# Patient Record
Sex: FEMALE | Race: WHITE | NOT HISPANIC OR LATINO | URBAN - METROPOLITAN AREA
[De-identification: names, ages, dates, MRNs, and addresses within clinical notes are randomized per-mention and may not be internally consistent; named-entity substitution may affect disease eponyms.]

---

## 2017-12-19 ENCOUNTER — EMERGENCY (EMERGENCY)
Facility: HOSPITAL | Age: 77
LOS: 0 days | Discharge: HOME | End: 2017-12-19
Attending: EMERGENCY MEDICINE | Admitting: EMERGENCY MEDICINE
Payer: MEDICARE

## 2017-12-19 VITALS
HEART RATE: 72 BPM | SYSTOLIC BLOOD PRESSURE: 142 MMHG | TEMPERATURE: 98 F | RESPIRATION RATE: 18 BRPM | DIASTOLIC BLOOD PRESSURE: 50 MMHG | OXYGEN SATURATION: 94 %

## 2017-12-19 VITALS — WEIGHT: 255.07 LBS | HEIGHT: 67 IN

## 2017-12-19 DIAGNOSIS — R05 COUGH: ICD-10-CM

## 2017-12-19 DIAGNOSIS — E03.9 HYPOTHYROIDISM, UNSPECIFIED: ICD-10-CM

## 2017-12-19 DIAGNOSIS — I49.9 CARDIAC ARRHYTHMIA, UNSPECIFIED: ICD-10-CM

## 2017-12-19 DIAGNOSIS — Z79.899 OTHER LONG TERM (CURRENT) DRUG THERAPY: ICD-10-CM

## 2017-12-19 DIAGNOSIS — J20.9 ACUTE BRONCHITIS, UNSPECIFIED: ICD-10-CM

## 2017-12-19 LAB
ALBUMIN SERPL ELPH-MCNC: 3.6 G/DL — SIGNIFICANT CHANGE UP (ref 3.3–5)
ALP SERPL-CCNC: 91 U/L — SIGNIFICANT CHANGE UP (ref 40–120)
ALT FLD-CCNC: 46 U/L — SIGNIFICANT CHANGE UP (ref 12–78)
ANION GAP SERPL CALC-SCNC: 11 MMOL/L — SIGNIFICANT CHANGE UP (ref 5–17)
APTT BLD: 25.9 SEC — LOW (ref 27.5–37.4)
AST SERPL-CCNC: 77 U/L — HIGH (ref 15–37)
BASOPHILS # BLD AUTO: 0.1 K/UL — SIGNIFICANT CHANGE UP (ref 0–0.2)
BASOPHILS NFR BLD AUTO: 1.2 % — SIGNIFICANT CHANGE UP (ref 0–2)
BILIRUB SERPL-MCNC: 0.8 MG/DL — SIGNIFICANT CHANGE UP (ref 0.2–1.2)
BUN SERPL-MCNC: 21 MG/DL — SIGNIFICANT CHANGE UP (ref 7–23)
CALCIUM SERPL-MCNC: 8.7 MG/DL — SIGNIFICANT CHANGE UP (ref 8.5–10.1)
CHLORIDE SERPL-SCNC: 101 MMOL/L — SIGNIFICANT CHANGE UP (ref 96–108)
CO2 SERPL-SCNC: 21 MMOL/L — LOW (ref 22–31)
CREAT SERPL-MCNC: 1.3 MG/DL — SIGNIFICANT CHANGE UP (ref 0.5–1.3)
EOSINOPHIL # BLD AUTO: 0.1 K/UL — SIGNIFICANT CHANGE UP (ref 0–0.5)
EOSINOPHIL NFR BLD AUTO: 3 % — SIGNIFICANT CHANGE UP (ref 0–6)
GLUCOSE SERPL-MCNC: 96 MG/DL — SIGNIFICANT CHANGE UP (ref 70–99)
HCT VFR BLD CALC: 45.4 % — HIGH (ref 34.5–45)
HGB BLD-MCNC: 15.6 G/DL — HIGH (ref 11.5–15.5)
INR BLD: 1.16 RATIO — SIGNIFICANT CHANGE UP (ref 0.88–1.16)
LYMPHOCYTES # BLD AUTO: 2.2 K/UL — SIGNIFICANT CHANGE UP (ref 1–3.3)
LYMPHOCYTES # BLD AUTO: 46.4 % — HIGH (ref 13–44)
MCHC RBC-ENTMCNC: 32.1 PG — SIGNIFICANT CHANGE UP (ref 27–34)
MCHC RBC-ENTMCNC: 34.4 GM/DL — SIGNIFICANT CHANGE UP (ref 32–36)
MCV RBC AUTO: 93.4 FL — SIGNIFICANT CHANGE UP (ref 80–100)
MONOCYTES # BLD AUTO: 0.9 K/UL — SIGNIFICANT CHANGE UP (ref 0–0.9)
MONOCYTES NFR BLD AUTO: 18.6 % — HIGH (ref 2–14)
NEUTROPHILS # BLD AUTO: 1.5 K/UL — LOW (ref 1.8–7.4)
NEUTROPHILS NFR BLD AUTO: 30.8 % — LOW (ref 43–77)
PLATELET # BLD AUTO: 138 K/UL — LOW (ref 150–400)
POTASSIUM SERPL-MCNC: 4.7 MMOL/L — SIGNIFICANT CHANGE UP (ref 3.5–5.3)
POTASSIUM SERPL-SCNC: 4.7 MMOL/L — SIGNIFICANT CHANGE UP (ref 3.5–5.3)
PROT SERPL-MCNC: 7.7 GM/DL — SIGNIFICANT CHANGE UP (ref 6–8.3)
PROTHROM AB SERPL-ACNC: 12.6 SEC — SIGNIFICANT CHANGE UP (ref 9.8–12.7)
RBC # BLD: 4.86 M/UL — SIGNIFICANT CHANGE UP (ref 3.8–5.2)
RBC # FLD: 12.2 % — SIGNIFICANT CHANGE UP (ref 10.3–14.5)
SODIUM SERPL-SCNC: 133 MMOL/L — LOW (ref 135–145)
WBC # BLD: 4.8 K/UL — SIGNIFICANT CHANGE UP (ref 3.8–10.5)
WBC # FLD AUTO: 4.8 K/UL — SIGNIFICANT CHANGE UP (ref 3.8–10.5)

## 2017-12-19 PROCEDURE — 93010 ELECTROCARDIOGRAM REPORT: CPT

## 2017-12-19 PROCEDURE — 99285 EMERGENCY DEPT VISIT HI MDM: CPT

## 2017-12-19 PROCEDURE — 71250 CT THORAX DX C-: CPT | Mod: 26

## 2017-12-19 RX ORDER — ALBUTEROL 90 UG/1
2 AEROSOL, METERED ORAL
Qty: 1 | Refills: 0
Start: 2017-12-19 | End: 2018-01-17

## 2017-12-19 RX ORDER — SODIUM CHLORIDE 9 MG/ML
1000 INJECTION INTRAMUSCULAR; INTRAVENOUS; SUBCUTANEOUS ONCE
Qty: 0 | Refills: 0 | Status: COMPLETED | OUTPATIENT
Start: 2017-12-19 | End: 2017-12-19

## 2017-12-19 RX ORDER — IPRATROPIUM/ALBUTEROL SULFATE 18-103MCG
3 AEROSOL WITH ADAPTER (GRAM) INHALATION ONCE
Qty: 0 | Refills: 0 | Status: COMPLETED | OUTPATIENT
Start: 2017-12-19 | End: 2017-12-19

## 2017-12-19 RX ORDER — ONDANSETRON 8 MG/1
4 TABLET, FILM COATED ORAL ONCE
Qty: 0 | Refills: 0 | Status: COMPLETED | OUTPATIENT
Start: 2017-12-19 | End: 2017-12-19

## 2017-12-19 RX ADMIN — ONDANSETRON 4 MILLIGRAM(S): 8 TABLET, FILM COATED ORAL at 18:27

## 2017-12-19 RX ADMIN — Medication 125 MILLIGRAM(S): at 18:27

## 2017-12-19 RX ADMIN — SODIUM CHLORIDE 1000 MILLILITER(S): 9 INJECTION INTRAMUSCULAR; INTRAVENOUS; SUBCUTANEOUS at 18:26

## 2017-12-19 RX ADMIN — Medication 3 MILLILITER(S): at 18:28

## 2017-12-19 NOTE — ED STATDOCS - ATTENDING CONTRIBUTION TO CARE
I, Ankit Lemus, performed the initial face to face bedside interview with this patient regarding history of present illness, review of symptoms and relevant past medical, social and family history.  I completed an independent physical examination.  I was the initial provider who evaluated this patient.  The history, relevant review of systems, past medical and surgical history, medical decision making, and physical examination was documented by the scribe in my presence and I attest to the accuracy of the documentation.  I have signed out the follow up of any pending tests (i.e. labs, radiological studies) to the ACP.  I have communicated the patient’s plan of care and disposition with the ACP.

## 2017-12-19 NOTE — ED STATDOCS - OBJECTIVE STATEMENT
78 yo healthy female sent in by Dr. Roper today for evaluation of cough and fever x 5 days.  States temp was 101F yesterday.  Pt has been weak, vomiting and unable to tolerate any PO intake. 78 yo female, with solitary kidney, sent in by Dr. Roper today for evaluation of cough and fever x 5 days.  States temp was 101F yesterday.  Pt has been weak, vomiting and unable to tolerate any PO intake. 78 yo female, PMH hypothyroidism, cardiac arrhythmia, solitary kidney, sent in by Dr. Roper today for evaluation of cough and fever x 5 days.  States temp was 101F yesterday.  Pt has been weak, vomiting and unable to tolerate any PO intake.

## 2017-12-19 NOTE — ED STATDOCS - PROGRESS NOTE DETAILS
PHILLIP Yousif:   Patient has been seen, evaluated and orders have been written by the attending in intake. Patient is stable.  I will follow up the results of orders written and I will continue to evaluate/observe the patient.  Patient with cough, finsihed three day course of Augmentin, then Biaxin x 2 days for bronchitis.  Pt. with decreased appetite and little PO intake.  Weakness reported.  Sent in for evaluation by Dr. Roper.  Fever 101 at home.  Hx hypothyroidism, arrythmia on beta blocker.  PE with wheezing RLL field.  Will obtain CT/labs/hydration/nebs.  Corinna Yousif PA-C Pt. much improved.  Pt. drinking and tolerating solids.  CT Chest negative for pneumonia.  Will treat with steroids, inhaler and PMD follow up.  Corinna Yousif PA-C Sat ox 94% RA

## 2017-12-19 NOTE — ED ADULT NURSE NOTE - OBJECTIVE STATEMENT
pt presents to ED with sore throat and fever x few days. pt states she was prescribed ABX with no relief. pt c/o generalized weakness and dizziness. denies v/d, a&Ox3. breathing si even and unlabored. will continue to monitor and reassess

## 2017-12-19 NOTE — ED ADULT TRIAGE NOTE - CHIEF COMPLAINT QUOTE
Pt sent by Dr. Roper for dizziness.  Recently treated for URI with clarithromycin, prior fever but afebrile at home today.

## 2018-08-31 ENCOUNTER — APPOINTMENT (OUTPATIENT)
Dept: DERMATOLOGY | Facility: CLINIC | Age: 78
End: 2018-08-31
Payer: MEDICARE

## 2018-08-31 VITALS — WEIGHT: 250 LBS | HEIGHT: 67 IN | BODY MASS INDEX: 39.24 KG/M2

## 2018-08-31 DIAGNOSIS — Z87.898 PERSONAL HISTORY OF OTHER SPECIFIED CONDITIONS: ICD-10-CM

## 2018-08-31 DIAGNOSIS — Z86.39 PERSONAL HISTORY OF OTHER ENDOCRINE, NUTRITIONAL AND METABOLIC DISEASE: ICD-10-CM

## 2018-08-31 DIAGNOSIS — D18.00 HEMANGIOMA UNSPECIFIED SITE: ICD-10-CM

## 2018-08-31 DIAGNOSIS — L82.1 OTHER SEBORRHEIC KERATOSIS: ICD-10-CM

## 2018-08-31 DIAGNOSIS — Z80.9 FAMILY HISTORY OF MALIGNANT NEOPLASM, UNSPECIFIED: ICD-10-CM

## 2018-08-31 PROCEDURE — 99203 OFFICE O/P NEW LOW 30 MIN: CPT

## 2018-08-31 RX ORDER — PROPRANOLOL HYDROCHLORIDE 80 MG/1
TABLET ORAL
Refills: 0 | Status: ACTIVE | COMMUNITY

## 2018-08-31 RX ORDER — LEVOTHYROXINE SODIUM 0.12 MG/1
125 TABLET ORAL
Refills: 0 | Status: ACTIVE | COMMUNITY

## 2018-09-01 PROBLEM — E03.9 HYPOTHYROIDISM, UNSPECIFIED: Chronic | Status: ACTIVE | Noted: 2017-12-20

## 2018-09-01 PROBLEM — I49.9 CARDIAC ARRHYTHMIA, UNSPECIFIED: Chronic | Status: ACTIVE | Noted: 2017-12-20

## 2018-09-24 ENCOUNTER — APPOINTMENT (OUTPATIENT)
Dept: DERMATOLOGY | Facility: CLINIC | Age: 78
End: 2018-09-24
Payer: MEDICARE

## 2018-09-24 VITALS — BODY MASS INDEX: 39.24 KG/M2 | HEIGHT: 67 IN | WEIGHT: 250 LBS

## 2018-09-24 DIAGNOSIS — Z41.1 ENCOUNTER FOR COSMETIC SURGERY: ICD-10-CM

## 2018-09-24 PROCEDURE — D0051: CPT

## 2018-12-10 ENCOUNTER — APPOINTMENT (OUTPATIENT)
Dept: DERMATOLOGY | Facility: CLINIC | Age: 78
End: 2018-12-10

## 2019-05-19 ENCOUNTER — INPATIENT (INPATIENT)
Facility: HOSPITAL | Age: 79
LOS: 1 days | Discharge: HOME | End: 2019-05-21
Attending: FAMILY MEDICINE | Admitting: FAMILY MEDICINE
Payer: MEDICARE

## 2019-05-19 VITALS
OXYGEN SATURATION: 98 % | SYSTOLIC BLOOD PRESSURE: 180 MMHG | TEMPERATURE: 98 F | RESPIRATION RATE: 17 BRPM | HEART RATE: 51 BPM | DIASTOLIC BLOOD PRESSURE: 70 MMHG

## 2019-05-19 DIAGNOSIS — Z90.5 ACQUIRED ABSENCE OF KIDNEY: Chronic | ICD-10-CM

## 2019-05-19 LAB
ADD ON TEST-SPECIMEN IN LAB: SIGNIFICANT CHANGE UP
ALBUMIN SERPL ELPH-MCNC: 3.7 G/DL — SIGNIFICANT CHANGE UP (ref 3.3–5)
ALP SERPL-CCNC: 113 U/L — SIGNIFICANT CHANGE UP (ref 40–120)
ALT FLD-CCNC: 241 U/L — HIGH (ref 12–78)
ANION GAP SERPL CALC-SCNC: 8 MMOL/L — SIGNIFICANT CHANGE UP (ref 5–17)
APPEARANCE UR: CLEAR — SIGNIFICANT CHANGE UP
AST SERPL-CCNC: 175 U/L — HIGH (ref 15–37)
BACTERIA # UR AUTO: ABNORMAL
BASOPHILS # BLD AUTO: 0.02 K/UL — SIGNIFICANT CHANGE UP (ref 0–0.2)
BASOPHILS NFR BLD AUTO: 0.3 % — SIGNIFICANT CHANGE UP (ref 0–2)
BILIRUB SERPL-MCNC: 0.7 MG/DL — SIGNIFICANT CHANGE UP (ref 0.2–1.2)
BILIRUB UR-MCNC: NEGATIVE — SIGNIFICANT CHANGE UP
BUN SERPL-MCNC: 22 MG/DL — SIGNIFICANT CHANGE UP (ref 7–23)
CALCIUM SERPL-MCNC: 9.4 MG/DL — SIGNIFICANT CHANGE UP (ref 8.5–10.1)
CHLORIDE SERPL-SCNC: 108 MMOL/L — SIGNIFICANT CHANGE UP (ref 96–108)
CO2 SERPL-SCNC: 27 MMOL/L — SIGNIFICANT CHANGE UP (ref 22–31)
COLOR SPEC: YELLOW — SIGNIFICANT CHANGE UP
CREAT SERPL-MCNC: 1.15 MG/DL — SIGNIFICANT CHANGE UP (ref 0.5–1.3)
D DIMER BLD IA.RAPID-MCNC: 397 NG/ML DDU — HIGH
DIFF PNL FLD: ABNORMAL
EOSINOPHIL # BLD AUTO: 0.01 K/UL — SIGNIFICANT CHANGE UP (ref 0–0.5)
EOSINOPHIL NFR BLD AUTO: 0.1 % — SIGNIFICANT CHANGE UP (ref 0–6)
EPI CELLS # UR: NEGATIVE — SIGNIFICANT CHANGE UP
GLUCOSE SERPL-MCNC: 111 MG/DL — HIGH (ref 70–99)
GLUCOSE UR QL: NEGATIVE MG/DL — SIGNIFICANT CHANGE UP
HCT VFR BLD CALC: 40.1 % — SIGNIFICANT CHANGE UP (ref 34.5–45)
HGB BLD-MCNC: 13.1 G/DL — SIGNIFICANT CHANGE UP (ref 11.5–15.5)
IMM GRANULOCYTES NFR BLD AUTO: 1.9 % — HIGH (ref 0–1.5)
INR BLD: 1.04 RATIO — SIGNIFICANT CHANGE UP (ref 0.88–1.16)
KETONES UR-MCNC: NEGATIVE — SIGNIFICANT CHANGE UP
LEUKOCYTE ESTERASE UR-ACNC: NEGATIVE — SIGNIFICANT CHANGE UP
LYMPHOCYTES # BLD AUTO: 1.64 K/UL — SIGNIFICANT CHANGE UP (ref 1–3.3)
LYMPHOCYTES # BLD AUTO: 21.2 % — SIGNIFICANT CHANGE UP (ref 13–44)
MAGNESIUM SERPL-MCNC: 2.3 MG/DL — SIGNIFICANT CHANGE UP (ref 1.6–2.6)
MCHC RBC-ENTMCNC: 31.7 PG — SIGNIFICANT CHANGE UP (ref 27–34)
MCHC RBC-ENTMCNC: 32.7 GM/DL — SIGNIFICANT CHANGE UP (ref 32–36)
MCV RBC AUTO: 97.1 FL — SIGNIFICANT CHANGE UP (ref 80–100)
MONOCYTES # BLD AUTO: 0.49 K/UL — SIGNIFICANT CHANGE UP (ref 0–0.9)
MONOCYTES NFR BLD AUTO: 6.3 % — SIGNIFICANT CHANGE UP (ref 2–14)
NEUTROPHILS # BLD AUTO: 5.41 K/UL — SIGNIFICANT CHANGE UP (ref 1.8–7.4)
NEUTROPHILS NFR BLD AUTO: 70.2 % — SIGNIFICANT CHANGE UP (ref 43–77)
NITRITE UR-MCNC: NEGATIVE — SIGNIFICANT CHANGE UP
NT-PROBNP SERPL-SCNC: 742 PG/ML — HIGH (ref 0–450)
NT-PROBNP SERPL-SCNC: 753 PG/ML — HIGH (ref 0–450)
PH UR: 7 — SIGNIFICANT CHANGE UP (ref 5–8)
PLATELET # BLD AUTO: 160 K/UL — SIGNIFICANT CHANGE UP (ref 150–400)
POTASSIUM SERPL-MCNC: 4 MMOL/L — SIGNIFICANT CHANGE UP (ref 3.5–5.3)
POTASSIUM SERPL-SCNC: 4 MMOL/L — SIGNIFICANT CHANGE UP (ref 3.5–5.3)
PROT SERPL-MCNC: 7.1 GM/DL — SIGNIFICANT CHANGE UP (ref 6–8.3)
PROT UR-MCNC: 100 MG/DL
PROTHROM AB SERPL-ACNC: 11.6 SEC — SIGNIFICANT CHANGE UP (ref 10–12.9)
RBC # BLD: 4.13 M/UL — SIGNIFICANT CHANGE UP (ref 3.8–5.2)
RBC # FLD: 14.3 % — SIGNIFICANT CHANGE UP (ref 10.3–14.5)
RBC CASTS # UR COMP ASSIST: SIGNIFICANT CHANGE UP /HPF (ref 0–4)
SODIUM SERPL-SCNC: 143 MMOL/L — SIGNIFICANT CHANGE UP (ref 135–145)
SP GR SPEC: 1 — LOW (ref 1.01–1.02)
TROPONIN I SERPL-MCNC: <0.015 NG/ML — SIGNIFICANT CHANGE UP (ref 0.01–0.04)
TROPONIN I SERPL-MCNC: <0.015 NG/ML — SIGNIFICANT CHANGE UP (ref 0.01–0.04)
TSH SERPL-MCNC: 2.97 UU/ML — SIGNIFICANT CHANGE UP (ref 0.34–4.82)
UROBILINOGEN FLD QL: NEGATIVE MG/DL — SIGNIFICANT CHANGE UP
WBC # BLD: 7.72 K/UL — SIGNIFICANT CHANGE UP (ref 3.8–10.5)
WBC # FLD AUTO: 7.72 K/UL — SIGNIFICANT CHANGE UP (ref 3.8–10.5)
WBC UR QL: SIGNIFICANT CHANGE UP

## 2019-05-19 PROCEDURE — 99285 EMERGENCY DEPT VISIT HI MDM: CPT

## 2019-05-19 PROCEDURE — 71045 X-RAY EXAM CHEST 1 VIEW: CPT | Mod: 26

## 2019-05-19 PROCEDURE — 93010 ELECTROCARDIOGRAM REPORT: CPT

## 2019-05-19 RX ORDER — LEVOTHYROXINE SODIUM 125 MCG
125 TABLET ORAL DAILY
Refills: 0 | Status: DISCONTINUED | OUTPATIENT
Start: 2019-05-19 | End: 2019-05-21

## 2019-05-19 RX ORDER — ASPIRIN/CALCIUM CARB/MAGNESIUM 324 MG
325 TABLET ORAL ONCE
Refills: 0 | Status: COMPLETED | OUTPATIENT
Start: 2019-05-19 | End: 2019-05-19

## 2019-05-19 RX ORDER — FUROSEMIDE 40 MG
40 TABLET ORAL ONCE
Refills: 0 | Status: DISCONTINUED | OUTPATIENT
Start: 2019-05-19 | End: 2019-05-19

## 2019-05-19 RX ORDER — HYDRALAZINE HCL 50 MG
10 TABLET ORAL EVERY 6 HOURS
Refills: 0 | Status: DISCONTINUED | OUTPATIENT
Start: 2019-05-19 | End: 2019-05-20

## 2019-05-19 RX ORDER — HYDRALAZINE HCL 50 MG
5 TABLET ORAL ONCE
Refills: 0 | Status: COMPLETED | OUTPATIENT
Start: 2019-05-19 | End: 2019-05-19

## 2019-05-19 RX ORDER — ASPIRIN/CALCIUM CARB/MAGNESIUM 324 MG
81 TABLET ORAL DAILY
Refills: 0 | Status: DISCONTINUED | OUTPATIENT
Start: 2019-05-19 | End: 2019-05-20

## 2019-05-19 RX ADMIN — Medication 5 MILLIGRAM(S): at 21:32

## 2019-05-19 RX ADMIN — Medication 325 MILLIGRAM(S): at 20:04

## 2019-05-19 NOTE — ED STATDOCS - PROGRESS NOTE DETAILS
signed Ann Min PA-C Pt seen initially in intake by Dr Owens.   78F c/o CP/SOB/LE edema x 2 weeks. Pt went to urgent care and was put on steroids for a chronic cough, but that hasn't helped. 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 Pt has no card or PMD, usually goes to [facility] to see Dr Mcmanus. Pt alert, NAD, 2+ pitting edema bilateral LEs. No conversational dyspnea. Pt isreal on EKG and on cardiac monitor she goes into low 40s. D Dimer negative when adjusted for age. Elevated LFTs of unclear etiology, slightly elevated BNP. cxr with cardiomegaly. Will admit for CP and bradycardia. Pt agrees with admission and plan of care. Case discussed with and admission accepted by hospitalist Dr. Wendy Suárez.

## 2019-05-19 NOTE — H&P ADULT - ASSESSMENT
78y F PMHx hypothyroidism, anxiety, presents to ED for chest pain. 78y F PMHx hypothyroidism, anxiety, presents to ED for chest pain.    # Chest pain likely secondary to hypertensive urgency  - admit to telemetry  - EKG noted  - Troponins : wnl  -  given in ED  - continue ASA 81   - Hydralazine 5 mg IV push and lasix given in ED  - continue  Hydralazine for BP control- no BB because of bradycardia  - 2 D Echocardiogram  - Cardiology consult    # sinus Bradycardia likely medication induced ( pt on Propranolol ) vs ischemia  - hold Propranolol for now and monitor HR  - hold off on BB   - consider EP consult - if no improvement after holding Propranolol  - hold off     # Transminitis  - Acute hepatitis panel  - US abdomen      # elevated proBNP  - control hypertension  - restrict Na intake  - continue to trend  - got lasix 40 mg in ED    # Elevated D- dimer  - CTA cannot be done because of history of dye allergy  - consider V/ Q scan  - Troponins wnl      # unilateral kidney  - avoid nephrotoxic medications  - will hold off on further lasix    # hypothyroidism  - continue Levothyroxine 125      # VTE  IMPROVE VTE Individual Risk Assessment    RISK                                                                Points  [  ] Previous VTE                                                  3  [  ] Thrombophilia                                               2  [  ] Lower limb paralysis                                      2        (unable to hold up >15 seconds)    [  ] Current Cancer                                              2         (within 6 months)  [  ] Immobilization > 24 hrs                                1  [  ] ICU/CCU stay > 24 hours                              1  [x  ] Age > 60                                                      1    IMPROVE VTE Score _____1____    IMPROVE Score 0-1: Low Risk, No VTE prophylaxis required for most patients, encourage ambulation.   IMPROVE Score 2-3: At risk, pharmacologic VTE prophylaxis is indicated for most patients (in the absence of a contraindication)  IMPROVE Score > or = 4: High Risk, pharmacologic VTE prophylaxis is indicated for most patients (in the absence of a contraindication) 78y F PMHx hypothyroidism, anxiety, presents to ED for chest pain.    # Chest pain likely secondary to hypertensive urgency  - admit to telemetry  - EKG noted  - Troponins : wnl  -  given in ED  - continue ASA 81   - Hydralazine 5 mg IV push and lasix given in ED  - continue  Hydralazine for BP control- no BB because of bradycardia  - 2 D Echocardiogram  - Cardiology consult    # sinus Bradycardia likely medication induced ( pt on Propranolol ) vs ischemia  - hold Propranolol for now and monitor HR  - hold off on BB   - consider EP consult - if no improvement after holding Propranolol  - hold off     # Transminitis  - Acute hepatitis panel  - US abdomen      # elevated proBNP  - control hypertension  - restrict Na intake  - continue to trend  - got lasix 40 mg in ED    # Elevated D- dimer 376  but is not positive for age appropriate  - CTA cannot be done because of history of dye allergy  - Wells criteria score of 0 -    - Troponins wnl    # lower extremity edema   - Lower extremity doppler      # unilateral kidney  - avoid nephrotoxic medications  - will hold off on further lasix    # hypothyroidism  - continue Levothyroxine 125      # VTE  IMPROVE VTE Individual Risk Assessment    RISK                                                                Points  [  ] Previous VTE                                                  3  [  ] Thrombophilia                                               2  [  ] Lower limb paralysis                                      2        (unable to hold up >15 seconds)    [  ] Current Cancer                                              2         (within 6 months)  [  ] Immobilization > 24 hrs                                1  [  ] ICU/CCU stay > 24 hours                              1  [x  ] Age > 60                                                      1    IMPROVE VTE Score _____1____    IMPROVE Score 0-1: Low Risk, No VTE prophylaxis required for most patients, encourage ambulation.   IMPROVE Score 2-3: At risk, pharmacologic VTE prophylaxis is indicated for most patients (in the absence of a contraindication)  IMPROVE Score > or = 4: High Risk, pharmacologic VTE prophylaxis is indicated for most patients (in the absence of a contraindication)

## 2019-05-19 NOTE — ED ADULT TRIAGE NOTE - CHIEF COMPLAINT QUOTE
Patient presents reporting chest pain shortness of breath since last week describes pain as burning. Reports increased blood pressure today and increased fluid retention

## 2019-05-19 NOTE — H&P ADULT - RS GEN PE MLT RESP DETAILS PC
no intercostal retractions/clear to auscultation bilaterally/airway patent/breath sounds equal/respirations non-labored/normal/no rales/no chest wall tenderness/no rhonchi/no subcutaneous emphysema/no wheezes

## 2019-05-19 NOTE — H&P ADULT - NSHPPHYSICALEXAM_GEN_ALL_CORE
ICU Vital Signs Last 24 Hrs  T(C): 36.5 (19 May 2019 16:54), Max: 36.5 (19 May 2019 16:54)  T(F): 97.7 (19 May 2019 16:54), Max: 97.7 (19 May 2019 16:54)  HR: 64 (19 May 2019 21:45) (51 - 64)  BP: 170/97 (19 May 2019 21:45) (170/97 - 180/89)  RR: 16 (19 May 2019 20:47) (16 - 17)  SpO2: 97% (19 May 2019 20:47) (97% - 98%)

## 2019-05-19 NOTE — ED STATDOCS - CLINICAL SUMMARY MEDICAL DECISION MAKING FREE TEXT BOX
signed Ann Min PA-C Pt seen initially in intake by Dr Owens.   78F c/o CP/SOB/LE edema x 2 weeks. Pt went to urgent care and was put on steroids for a chronic cough, but that hasn't helped. 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 Pt has no card or PMD, usually goes to [facility] to see Dr Mcmanus. Pt alert, NAD, 2+ pitting edema bilateral LEs. No conversational dyspnea. Pt isreal on EKG and on cardiac monitor she goes into low 40s. D Dimer negative when adjusted for age. Elevated LFTs of unclear etiology, slightly elevated BNP. cxr with cardiomegaly. Will admit for CP and bradycardia. Pt agrees with admission and plan of care. Case discussed with and admission accepted by hospitalist Dr. Wendy Suárez.

## 2019-05-19 NOTE — H&P ADULT - ATTENDING COMMENTS
78y F PMHx as noted above presents to the ED for further evaluation of c/o intermittent symptoms of chest discomfort.    # Chest pain likely secondary to hypertensive urgency  ~admit to Telemetry (Observation)  ~serial EKGs/Rodolfo  ~given ASA 325mg PO x 1 in the ED  ~cont. ASA 81mg po daily  ~cont. BP management as above  ~consider 2DECHO  ~f/u w/ Cardiology in the am    #Sinus Bradycardia  ~DDx includes medication induced ( pt on Propranolol ) vs ischemia  ~will hold B-blockade for now  ~consider formal EP consultation if no improvement    # Transaminitis  ~f/u Acute hepatitis panel  ~f/u US abdomen    #Elevated D- dimer  ~D-dimer is 376 =>(-) when adjusted for age  ~Wells criteria score of 0 -    ~Troponins wnl    #Lower extremity edema   ~edema is bilateral   ~consider Lower extremity doppler    #Unilateral kidney  ~cont. management as outlined above    #Hypothyroidism  ~cont. Levothyroxine 125mcg po daily    #VTE  ~IMPROVE VTE Risk Score is 1  [  ] Previous VTE                                                  3  [  ] Thrombophilia                                               2  [  ] Lower limb paralysis                                      2        (unable to hold up >15 seconds)    [  ] Current Cancer                                              2         (within 6 months)  [  ] Immobilization > 24 hrs                                1  [  ] ICU/CCU stay > 24 hours                              1  [x  ] Age > 60                                                      1  ~cont. SCDs for now

## 2019-05-19 NOTE — ED ADULT NURSE REASSESSMENT NOTE - NS ED NURSE REASSESS COMMENT FT1
Dr. Inman made aware of elevated ddimer.  no new orders at this time. Report given to Maria Guadalupe DURAND on 3E. pending transport.

## 2019-05-19 NOTE — ED STATDOCS - OBJECTIVE STATEMENT
77 y/o female with a PMHx of Hashimoto's disease, PNA s/p left lobectomy, one kidney presents to the ED c/o mid sternal chest pain starting 3 hours PTA and noted elevated BP today. Also reports chronic SOB and cough, recently reoccurring since 2 weeks ago. Only acute symptom is chest pain. Pt recently had doppler of carotids at [facility]. Allergy to Cipro, Vicodin and IV contrast.

## 2019-05-19 NOTE — ED ADULT NURSE NOTE - CHPI ED NUR SYMPTOMS NEG
no syncope/no vomiting/no chills/no back pain/no fever/no nausea/no diaphoresis/no congestion/no dizziness

## 2019-05-19 NOTE — H&P ADULT - HISTORY OF PRESENT ILLNESS
78y F PMHx hypothyroidism, anxiety, presents to ED for chest pain.    Pain started suddenly today while she was sitting, describes it as burning pain 5/10, localized to the precordium, non radiating. Pain was continous and stayed the same intensity. No alleviating/ exacerbating factors. Over the next hour the pain, the pain spontaneously resolved on its own- BP at the time was 200/110 prompting her to come to the ED. This accompanied with shortness of breath . Last couple of days has been having progressively increasing swelling of legs. No associated chest pressure, palpitations.    In the ED, continued to have hypertension.  Was given Lasix 40 mg and then Hydralazine 5 mg.     Last 2 weeks she has been having productive cough, of white sputum and was prescribed  prednisone pack. She had completed 3/6 days. ( 16 mg today)    In the past has been worked up for cardiac ischemia but results have been negative.

## 2019-05-20 DIAGNOSIS — R07.9 CHEST PAIN, UNSPECIFIED: ICD-10-CM

## 2019-05-20 DIAGNOSIS — E03.9 HYPOTHYROIDISM, UNSPECIFIED: ICD-10-CM

## 2019-05-20 DIAGNOSIS — R00.1 BRADYCARDIA, UNSPECIFIED: ICD-10-CM

## 2019-05-20 LAB
ADD ON TEST-SPECIMEN IN LAB: SIGNIFICANT CHANGE UP
ALBUMIN SERPL ELPH-MCNC: 3.6 G/DL — SIGNIFICANT CHANGE UP (ref 3.3–5)
ALP SERPL-CCNC: 101 U/L — SIGNIFICANT CHANGE UP (ref 40–120)
ALT FLD-CCNC: 186 U/L — HIGH (ref 12–78)
ANION GAP SERPL CALC-SCNC: 9 MMOL/L — SIGNIFICANT CHANGE UP (ref 5–17)
AST SERPL-CCNC: 78 U/L — HIGH (ref 15–37)
BASOPHILS # BLD AUTO: 0.02 K/UL — SIGNIFICANT CHANGE UP (ref 0–0.2)
BASOPHILS NFR BLD AUTO: 0.2 % — SIGNIFICANT CHANGE UP (ref 0–2)
BILIRUB SERPL-MCNC: 0.8 MG/DL — SIGNIFICANT CHANGE UP (ref 0.2–1.2)
BUN SERPL-MCNC: 18 MG/DL — SIGNIFICANT CHANGE UP (ref 7–23)
CALCIUM SERPL-MCNC: 9.2 MG/DL — SIGNIFICANT CHANGE UP (ref 8.5–10.1)
CHLORIDE SERPL-SCNC: 108 MMOL/L — SIGNIFICANT CHANGE UP (ref 96–108)
CO2 SERPL-SCNC: 28 MMOL/L — SIGNIFICANT CHANGE UP (ref 22–31)
CREAT SERPL-MCNC: 0.97 MG/DL — SIGNIFICANT CHANGE UP (ref 0.5–1.3)
EOSINOPHIL # BLD AUTO: 0 K/UL — SIGNIFICANT CHANGE UP (ref 0–0.5)
EOSINOPHIL NFR BLD AUTO: 0 % — SIGNIFICANT CHANGE UP (ref 0–6)
GLUCOSE SERPL-MCNC: 101 MG/DL — HIGH (ref 70–99)
HAV IGM SER-ACNC: ABNORMAL
HBV CORE IGM SER-ACNC: SIGNIFICANT CHANGE UP
HBV SURFACE AG SER-ACNC: SIGNIFICANT CHANGE UP
HCT VFR BLD CALC: 39 % — SIGNIFICANT CHANGE UP (ref 34.5–45)
HCV AB S/CO SERPL IA: 0.06 S/CO — SIGNIFICANT CHANGE UP (ref 0–0.99)
HCV AB SERPL-IMP: SIGNIFICANT CHANGE UP
HGB BLD-MCNC: 12.6 G/DL — SIGNIFICANT CHANGE UP (ref 11.5–15.5)
IMM GRANULOCYTES NFR BLD AUTO: 1.6 % — HIGH (ref 0–1.5)
LYMPHOCYTES # BLD AUTO: 2.45 K/UL — SIGNIFICANT CHANGE UP (ref 1–3.3)
LYMPHOCYTES # BLD AUTO: 30.1 % — SIGNIFICANT CHANGE UP (ref 13–44)
MAGNESIUM SERPL-MCNC: 2.2 MG/DL — SIGNIFICANT CHANGE UP (ref 1.6–2.6)
MCHC RBC-ENTMCNC: 31.1 PG — SIGNIFICANT CHANGE UP (ref 27–34)
MCHC RBC-ENTMCNC: 32.3 GM/DL — SIGNIFICANT CHANGE UP (ref 32–36)
MCV RBC AUTO: 96.3 FL — SIGNIFICANT CHANGE UP (ref 80–100)
MONOCYTES # BLD AUTO: 0.74 K/UL — SIGNIFICANT CHANGE UP (ref 0–0.9)
MONOCYTES NFR BLD AUTO: 9.1 % — SIGNIFICANT CHANGE UP (ref 2–14)
NEUTROPHILS # BLD AUTO: 4.8 K/UL — SIGNIFICANT CHANGE UP (ref 1.8–7.4)
NEUTROPHILS NFR BLD AUTO: 59 % — SIGNIFICANT CHANGE UP (ref 43–77)
PLATELET # BLD AUTO: 149 K/UL — LOW (ref 150–400)
POTASSIUM SERPL-MCNC: 3 MMOL/L — LOW (ref 3.5–5.3)
POTASSIUM SERPL-SCNC: 3 MMOL/L — LOW (ref 3.5–5.3)
PROT SERPL-MCNC: 6.5 GM/DL — SIGNIFICANT CHANGE UP (ref 6–8.3)
RBC # BLD: 4.05 M/UL — SIGNIFICANT CHANGE UP (ref 3.8–5.2)
RBC # FLD: 14.3 % — SIGNIFICANT CHANGE UP (ref 10.3–14.5)
SODIUM SERPL-SCNC: 145 MMOL/L — SIGNIFICANT CHANGE UP (ref 135–145)
WBC # BLD: 8.14 K/UL — SIGNIFICANT CHANGE UP (ref 3.8–10.5)
WBC # FLD AUTO: 8.14 K/UL — SIGNIFICANT CHANGE UP (ref 3.8–10.5)

## 2019-05-20 PROCEDURE — 76700 US EXAM ABDOM COMPLETE: CPT | Mod: 26

## 2019-05-20 PROCEDURE — 93018 CV STRESS TEST I&R ONLY: CPT

## 2019-05-20 PROCEDURE — 93970 EXTREMITY STUDY: CPT | Mod: 26

## 2019-05-20 PROCEDURE — 93016 CV STRESS TEST SUPVJ ONLY: CPT

## 2019-05-20 PROCEDURE — 71250 CT THORAX DX C-: CPT | Mod: 26

## 2019-05-20 RX ORDER — ALPRAZOLAM 0.25 MG
0.25 TABLET ORAL AT BEDTIME
Refills: 0 | Status: DISCONTINUED | OUTPATIENT
Start: 2019-05-20 | End: 2019-05-20

## 2019-05-20 RX ORDER — POTASSIUM CHLORIDE 20 MEQ
40 PACKET (EA) ORAL EVERY 4 HOURS
Refills: 0 | Status: COMPLETED | OUTPATIENT
Start: 2019-05-20 | End: 2019-05-20

## 2019-05-20 RX ORDER — PANTOPRAZOLE SODIUM 20 MG/1
40 TABLET, DELAYED RELEASE ORAL
Refills: 0 | Status: DISCONTINUED | OUTPATIENT
Start: 2019-05-20 | End: 2019-05-21

## 2019-05-20 RX ORDER — HYDRALAZINE HCL 50 MG
10 TABLET ORAL ONCE
Refills: 0 | Status: COMPLETED | OUTPATIENT
Start: 2019-05-20 | End: 2019-05-20

## 2019-05-20 RX ORDER — HYDRALAZINE HCL 50 MG
25 TABLET ORAL EVERY 6 HOURS
Refills: 0 | Status: DISCONTINUED | OUTPATIENT
Start: 2019-05-20 | End: 2019-05-21

## 2019-05-20 RX ORDER — HYDRALAZINE HCL 50 MG
25 TABLET ORAL EVERY 6 HOURS
Refills: 0 | Status: DISCONTINUED | OUTPATIENT
Start: 2019-05-20 | End: 2019-05-20

## 2019-05-20 RX ADMIN — Medication 10 MILLIGRAM(S): at 06:07

## 2019-05-20 RX ADMIN — Medication 125 MICROGRAM(S): at 06:07

## 2019-05-20 RX ADMIN — Medication 81 MILLIGRAM(S): at 11:11

## 2019-05-20 RX ADMIN — Medication 25 MILLIGRAM(S): at 18:30

## 2019-05-20 RX ADMIN — Medication 0.25 MILLIGRAM(S): at 20:46

## 2019-05-20 RX ADMIN — Medication 20 MILLIEQUIVALENT(S): at 09:05

## 2019-05-20 RX ADMIN — Medication 10 MILLIGRAM(S): at 20:42

## 2019-05-20 RX ADMIN — Medication 10 MILLIGRAM(S): at 11:11

## 2019-05-20 RX ADMIN — Medication 40 MILLIEQUIVALENT(S): at 15:32

## 2019-05-20 RX ADMIN — Medication 10 MILLIGRAM(S): at 17:26

## 2019-05-20 RX ADMIN — PANTOPRAZOLE SODIUM 40 MILLIGRAM(S): 20 TABLET, DELAYED RELEASE ORAL at 09:03

## 2019-05-20 NOTE — CONSULT NOTE ADULT - SUBJECTIVE AND OBJECTIVE BOX
HPI:  78 y.o.f admitted with h/o chest pain & cough rx with me, Painis burning pain 5/10, localized to the precordium, non radiating. Pain was continous and stayed the same intensity. No alleviating/ exacerbating factors. Over the next hour the pain, the pain spontaneously resolved on its own- BP at the time was 200/110 prompting her to come to the ED. This accompanied with shortness of breath . Last couple of days has been having progressively increasing swelling of legs. No associated chest pressure, palpitations. In the ED, pat rx with Lasix 40 mg and then Hydralazine 5 mg. Cough with white sputum two weeks ago and was prescribed  prednisone pack. cough is nearly resolved, In the past has been worked up for cardiac ischemia but results have been negative. pat lying in bed, better.    PAST MEDICAL & SURGICAL HISTORY:  Arrhythmia  Hypothyroidism  H/O unilateral nephrectomy      Home Medications:      MEDICATIONS  (STANDING):  aspirin enteric coated 81 milliGRAM(s) Oral daily  hydrALAZINE 10 milliGRAM(s) Oral every 6 hours  levothyroxine 125 MICROGram(s) Oral daily  pantoprazole    Tablet 40 milliGRAM(s) Oral before breakfast  potassium chloride    Tablet ER 40 milliEquivalent(s) Oral every 4 hours    MEDICATIONS  (PRN):      Allergies    Augmentin (Swelling)  ciprofloxacin (Unknown)  IV Contrast (Unknown)  Vicodin (Unknown)    Intolerances        SOCIAL HISTORY: Denies tobacco, etoh abuse or illicit drug use    FAMILY HISTORY:  Family history of sinus bradycardia      Vital Signs Last 24 Hrs  T(C): 36.7 (20 May 2019 05:52), Max: 36.8 (19 May 2019 22:50)  T(F): 98.1 (20 May 2019 05:52), Max: 98.2 (19 May 2019 22:50)  HR: 81 (20 May 2019 05:52) (51 - 81)  BP: 131/70 (20 May 2019 05:52) (131/70 - 180/90)  BP(mean): --  RR: 18 (20 May 2019 03:17) (16 - 18)  SpO2: 95% (20 May 2019 05:52) (95% - 98%)        REVIEW OF SYSTEMS:    CONSTITUTIONAL:  As per HPI. chest pain  HEENT:  Eyes:  No diplopia or blurred vision. ENT:  No earache, sore throat or runny nose.  CARDIOVASCULAR:  No pressure, squeezing, tightness, heaviness or aching about the chest, neck, axilla or epigastrium.  RESPIRATORY:  + cough, shortness of breath, PND or orthopnea.  GASTROINTESTINAL:  No nausea, vomiting or diarrhea.  GENITOURINARY:  No dysuria, frequency or urgency.  MUSCULOSKELETAL:  As per HPI.  SKIN:  No change in skin, hair or nails.  NEUROLOGIC:  No paresthesias, fasciculations, seizures or weakness.  PSYCHIATRIC:  No disorder of thought or mood.  ENDOCRINE:  No heat or cold intolerance, polyuria or polydipsia.  HEMATOLOGICAL:  No easy bruising or bleedings:  .     PHYSICAL EXAMINATION:    GENERAL APPEARANCE:  Pt. is not currently dyspneic, in no distress. Pt. is alert, oriented, and pleasant.  HEENT:  Pupils are normal and react normally. No icterus. Mucous membranes well colored.  NECK:  Supple. No lymphadenopathy. Jugular venous pressure not elevated. Carotids equal.   HEART:   The cardiac impulse has a normal quality. Regular. Normal S1 and S2. There are no murmurs, rubs or gallops noted  CHEST:  Chest is clear to auscultation. Normal respiratory effort.  ABDOMEN:  Soft and nontender.   EXTREMITIES:  There is no cyanosis, clubbing or edema.   SKIN:  No rash or significant lesions are noted.    LABS:                        12.6   8.14  )-----------( 149      ( 20 May 2019 05:56 )             39.0         145  |  108  |  18  ----------------------------<  101<H>  3.0<L>   |  28  |  0.97    Ca    9.2      20 May 2019 05:56  Mg     2.3         TPro  6.5  /  Alb  3.6  /  TBili  0.8  /  DBili  x   /  AST  78<H>  /  ALT  186<H>  /  AlkPhos  101      LIVER FUNCTIONS - ( 20 May 2019 05:56 )  Alb: 3.6 g/dL / Pro: 6.5 gm/dL / ALK PHOS: 101 U/L / ALT: 186 U/L / AST: 78 U/L / GGT: x           PT/INR - ( 19 May 2019 17:21 )   PT: 11.6 sec;   INR: 1.04 ratio           CARDIAC MARKERS ( 19 May 2019 21:04 )  <0.015 ng/mL / x     / x     / x     / x      CARDIAC MARKERS ( 19 May 2019 17:21 )  <0.015 ng/mL / x     / x     / x     / x          Urinalysis Basic - ( 19 May 2019 17:21 )    Color: Yellow / Appearance: Clear / S.005 / pH: x  Gluc: x / Ketone: Negative  / Bili: Negative / Urobili: Negative mg/dL   Blood: x / Protein: 100 mg/dL / Nitrite: Negative   Leuk Esterase: Negative / RBC: 0-2 /HPF / WBC 0-2   Sq Epi: x / Non Sq Epi: Negative / Bacteria: Occasional    RADIOLOGY & ADDITIONAL STUDIES:     US Duplex Venous Lower Ext Complete, Bilateral (19 @ 02:32) >  IMPRESSION:     1. Superficial edema.   2. No DVT.    Abdomen Complete (19 @ 02:33) >  IMPRESSION:     Findings suggesting hepatic steatosis.    Small right pleural effusion.    A pulmonary report was given by the [facility] radiologist    CXR: clear lung fields

## 2019-05-20 NOTE — CONSULT NOTE ADULT - ASSESSMENT
PROBLEMS:    Chest pain likely secondary to hypertensive urgency  Viral Syndrome- costochondritis with viral reactivetivety with Sinus Bradycardia r/o medication induced ( pt on Propranolol ) vs ischemia  R pleural effusion- pleuro-pericarditis  lower extremity edema   unilateral kidney  hypothyroidism    PLAN;    SYMTOMATIC RX  ECHO  CT CHEST TO EVALUATE PLEURAL EFFUSION  SUPPORTIVE CARE  DVT PROPHLASIX

## 2019-05-20 NOTE — PROGRESS NOTE ADULT - SUBJECTIVE AND OBJECTIVE BOX
Exercise Echo stress test performed without complication.   exercised 6 minutes of a modified kinjal protocol.   no evidence of inducible ischemia.

## 2019-05-20 NOTE — PROGRESS NOTE ADULT - SUBJECTIVE AND OBJECTIVE BOX
CHIEF COMPLAINT: chest pain    SUBJECTIVE: 78y F PMHx hypothyroidism, anxiety, presented to ED on 5/19/19 for chest pain. Pain started suddenly while she was sitting, describes it as burning pain 5/10, localized to the precordium, non radiating lasting 1 hour. CP associated with SOB. No alleviating/ exacerbating factors. While patient experiencing CP,  measured BP at home and was noted to be 200/110 prompting her to come to the ED. Last couple of days has been having progressively increasing swelling of legs. No associated chest pressure, palpitations.    5/20/19: This AM, patient denies any SOB or CP this morning. Patient is concerned as she has never had high blood pressure in the past.     REVIEW OF SYSTEMS:  CONSTITUTIONAL: No weakness, fevers or chills  EYES/ENT: No visual changes;  No vertigo or throat pain   NECK: No pain or stiffness  RESPIRATORY: No cough, wheezing, hemoptysis; No shortness of breath  CARDIOVASCULAR: No chest pain or palpitations  GASTROINTESTINAL: No abdominal or epigastric pain. No nausea, vomiting, or hematemesis; No diarrhea or constipation. No melena or hematochezia.  GENITOURINARY: No dysuria, frequency or hematuria  NEUROLOGICAL: No numbness or weakness  SKIN: No itching, burning, rashes, or lesions   All other review of systems is negative unless indicated above    Vital Signs Last 24 Hrs  T(C): 36.6 (20 May 2019 10:58), Max: 36.8 (19 May 2019 22:50)  T(F): 97.8 (20 May 2019 10:58), Max: 98.2 (19 May 2019 22:50)  HR: 59 (20 May 2019 15:46) (52 - 81)  BP: 152/69 (20 May 2019 15:46) (131/70 - 180/90)  RR: 18 (20 May 2019 15:46) (16 - 18)  SpO2: 98% (20 May 2019 15:46) (95% - 98%)    PHYSICAL EXAM:  Constitutional: NAD, awake and alert, well-developed, obese   HEENT: PERR, EOMI, Normal Hearing, MMM, glasses in place  Neck: Soft and supple, No LAD, No JVD  Respiratory: Breath sounds are clear bilaterally, No wheezing, rales or rhonchi  Cardiovascular: S1 and S2, regular rate and rhythm, no Murmurs, gallops or rubs  Gastrointestinal: Bowel Sounds present, soft, nontender, nondistended, no guarding, no rebound  Extremities: Mild peripheral edema to mid calf  Vascular: 2+ peripheral pulses  Neurological: A/O x 3, no focal deficits  Musculoskeletal: 5/5 strength b/l upper and lower extremities  Skin: No rashes    MEDICATIONS:  MEDICATIONS  (STANDING):  ALPRAZolam 0.25 milliGRAM(s) Oral at bedtime  aspirin enteric coated 81 milliGRAM(s) Oral daily  levothyroxine 125 MICROGram(s) Oral daily  pantoprazole    Tablet 40 milliGRAM(s) Oral before breakfast      LABS: All Labs Reviewed:                        12.6   8.14  )-----------( 149      ( 20 May 2019 05:56 )             39.0     05-20    145  |  108  |  18  ----------------------------<  101<H>  3.0<L>   |  28  |  0.97    Ca    9.2      20 May 2019 05:56  Mg     2.2     05-20    TPro  6.5  /  Alb  3.6  /  TBili  0.8  /  DBili  x   /  AST  78<H>  /  ALT  186<H>  /  AlkPhos  101  05-20       CARDIAC MARKERS ( 19 May 2019 21:04 )  <0.015 ng/mL / x     / x     / x     / x      CARDIAC MARKERS ( 19 May 2019 17:21 )  <0.015 ng/mL / x     / x     / x     / x          Blood Culture:     RADIOLOGY/EKG:    DVT PPX:    ADVANCED DIRECTIVE:    DISPOSITION: CHIEF COMPLAINT: chest pain    SUBJECTIVE: 78y F PMHx hypothyroidism, anxiety, presented to ED on 5/19/19 for chest pain. Pain started suddenly while she was sitting, describes it as burning pain 5/10, localized to the precordium, non radiating lasting 1 hour. CP associated with SOB. No alleviating/ exacerbating factors. While patient experiencing CP,  measured BP at home and was noted to be 200/110 prompting her to come to the ED. Last couple of days has been having progressively increasing swelling of legs. No associated chest pressure, palpitations.    5/20/19: This AM, patient denies any SOB or CP this morning. Patient is concerned as she has never had high blood pressure in the past.     REVIEW OF SYSTEMS:  CONSTITUTIONAL: No weakness, fevers or chills  EYES/ENT: No visual changes;  No vertigo or throat pain   NECK: No pain or stiffness  RESPIRATORY: No cough, wheezing, hemoptysis; No shortness of breath  CARDIOVASCULAR: No chest pain or palpitations  GASTROINTESTINAL: No abdominal or epigastric pain. No nausea, vomiting, or hematemesis; No diarrhea or constipation. No melena or hematochezia.  GENITOURINARY: No dysuria, frequency or hematuria  NEUROLOGICAL: No numbness or weakness  SKIN: No itching, burning, rashes, or lesions   All other review of systems is negative unless indicated above    Vital Signs Last 24 Hrs  T(C): 36.6 (20 May 2019 10:58), Max: 36.8 (19 May 2019 22:50)  T(F): 97.8 (20 May 2019 10:58), Max: 98.2 (19 May 2019 22:50)  HR: 59 (20 May 2019 15:46) (52 - 81)  BP: 152/69 (20 May 2019 15:46) (131/70 - 180/90)  RR: 18 (20 May 2019 15:46) (16 - 18)  SpO2: 98% (20 May 2019 15:46) (95% - 98%)    PHYSICAL EXAM:  Constitutional: NAD, awake and alert, well-developed, obese   HEENT: PERR, EOMI, Normal Hearing, MMM, glasses in place  Neck: Soft and supple, No LAD, No JVD  Respiratory: Breath sounds are clear bilaterally, No wheezing, rales or rhonchi  Cardiovascular: S1 and S2, regular rate and rhythm, no Murmurs, gallops or rubs  Gastrointestinal: Bowel Sounds present, soft, nontender, nondistended, no guarding, no rebound  Extremities: Mild peripheral edema to mid calf  Vascular: 2+ peripheral pulses  Neurological: A/O x 3, no focal deficits  Musculoskeletal: 5/5 strength b/l upper and lower extremities  Skin: No rashes    MEDICATIONS:  MEDICATIONS  (STANDING):  ALPRAZolam 0.25 milliGRAM(s) Oral at bedtime  aspirin enteric coated 81 milliGRAM(s) Oral daily  levothyroxine 125 MICROGram(s) Oral daily  pantoprazole    Tablet 40 milliGRAM(s) Oral before breakfast      LABS: All Labs Reviewed:                        12.6   8.14  )-----------( 149      ( 20 May 2019 05:56 )             39.0     05-20    145  |  108  |  18  ----------------------------<  101<H>  3.0<L>   |  28  |  0.97    Ca    9.2      20 May 2019 05:56  Mg     2.2     05-20    TPro  6.5  /  Alb  3.6  /  TBili  0.8  /  DBili  x   /  AST  78<H>  /  ALT  186<H>  /  AlkPhos  101  05-20       CARDIAC MARKERS ( 19 May 2019 21:04 )  <0.015 ng/mL / x     / x     / x     / x      CARDIAC MARKERS ( 19 May 2019 17:21 )  <0.015 ng/mL / x     / x     / x     / x

## 2019-05-20 NOTE — PROGRESS NOTE ADULT - ATTENDING COMMENTS
Patient seen and examined with Family Medicine Residents Antonio Stevenson, Diana Vinson and Ana Claudio on the Family Medicine Teaching Service.  Agree with history, physical, labs and plan which were reviewed in detail after a face to face encounter with the patient.

## 2019-05-20 NOTE — CONSULT NOTE ADULT - ASSESSMENT
5/20/19:  Pt with above history and somewhat atypical chest and abd pains since taking steroid.  Possible GI source bur should r/o underlying ischemia.  She refuses a nuclear stress test but will do a stress echo.  Continue to teat her BP as per medicine etal.  Agree with Hydralazine for now given her 1 kidney.  If stress echo is (-) can consider discharge home with outpt follow-up.  With possible GI source of pain, would hold off on ASA for now unless stress echo (+).  Will follow.

## 2019-05-20 NOTE — PROGRESS NOTE ADULT - ASSESSMENT
78y F PMHx hypothyroidism, anxiety presenting to ED due to CP and SOB.     #Chest Pain  - possibly GI in origin  - continue protonix 40mg daily    # SOB with chronic cough  - small pleural effusion noted on CT scan  -     #hypertensive urgency  - noted to have SBP ~200 in ED  - BP continue to be mildly elevated today  - additional hydralazine 10mg now  - Hydralazine increased to 25mg [id number]  - will continue to monitor BP overnight    #Hypothyroidism  - continue Levothyroxine 125mcg daily    #Anxiety  - may be contributing to HTN  - Xanax 0.25mg QHS PRN added     #Diet: will change to TLC/ DASH    #Dispo: d/c planning for AM if BP controlled 78y F PMHx hypothyroidism, anxiety presenting to ED due to CP and SOB.     #Chest Pain  - possibly GI in origin  - continue protonix 40mg daily    # SOB with chronic cough  - small pleural effusion noted on CT scan  -     #hypertensive urgency  - noted to have SBP ~200 in ED  - BP continue to be mildly elevated today  - additional hydralazine 10mg now  - Hydralazine increased to 25mg [id number]  - will continue to monitor BP overnight    #hypokalemia  - K+ 3.0 today  - s/p 40meq Q3 x 2 doses today  - will recheck BMP in AM    #Hypothyroidism  - continue Levothyroxine 125mcg daily    #Anxiety  - may be contributing to HTN  - Xanax 0.25mg QHS PRN added     #Diet: will change to TLC/ DASH    #Dispo: d/c planning for AM if BP controlled 78y F PMHx hypothyroidism, anxiety presenting to ED due to CP and SOB.     #Chest Pain  - stress echo negative today  - possibly GI in origin  - continue protonix 40mg daily  - cardiology recommendations appreciated    # SOB with chronic cough  - small pleural effusion noted on CT scan  - pulmonary recommendations appreciated    #hypertensive urgency  - noted to have SBP ~200 in ED  - BP continue to be mildly elevated today  - additional hydralazine 10mg now  - Hydralazine increased to 25mg [id number]  - will continue to monitor BP overnight    #hypokalemia  - K+ 3.0 today  - s/p 40meq Q3 x 2 doses today  - will recheck BMP in AM    #Hypothyroidism  - continue Levothyroxine 125mcg daily    #Anxiety  - may be contributing to HTN  - Xanax 0.25mg QHS PRN added     #Diet: will change to TLC/ DASH    #Dispo: d/c planning for AM if BP controlled

## 2019-05-20 NOTE — CONSULT NOTE ADULT - SUBJECTIVE AND OBJECTIVE BOX
CHIEF COMPLAINT:  Patient is a 78y old  Female who presents with a chief complaint of Chest pain (19 May 2019 22:05)      HPI: 5/20/19:  78y F with PMHx hypothyroidism, anxiety, presents to ED for chest pain.  Pain started suddenly on 5/19/19 while she was sitting, describes it as burning pain 5/10, localized to the precordium, non radiating. Pain was continuos and stayed the same intensity. No alleviating/ exacerbating factors. Over the next hour the pain, the pain spontaneously resolved on its own- BP at the time was 200/110 prompting her to come to the ED. This accompanied with shortness of breath. Last couple of days has been having progressively increasing swelling of legs. No associated chest pressure, palpitations.  She's been suffering with ? bronchitis treated by Dr Mcmanus at the [facility], initially with antibiotics a few months ago and then with Steroids 1 week ago.  She has no prior h/o HTN, DM but has had borderline elevated lipids but not on treatment.  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  In the ED, continued to have hypertension.  D-Dimer was mildly elevate but doppler of legs (-).  Was given Lasix 40 mg and then Hydralazine 5 mg. Last 2 weeks she has been having productive cough, of white sputum and was prescribed  prednisone pack. She had completed 3/6 days. ( 16 mg today)  In the past has been worked up for cardiac ischemia but results have been negative. 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          PMHx:  PAST MEDICAL & SURGICAL HISTORY:  Arrhythmia  Hypothyroidism  H/O unilateral nephrectomy      FAMILY HISTORY:   FAMILY HISTORY:  Family history of sinus bradycardia      ALLERGIES:  Allergies  Augmentin (Swelling)  ciprofloxacin (Unknown)  IV Contrast (Unknown)  Vicodin (Unknown)      REVIEW OF SYSTEMS:    CONSTITUTIONAL: No weakness, fevers or chills  EYES/ENT: No visual changes;  No vertigo or throat pain   NECK: No pain or stiffness  RESPIRATORY: No cough, wheezing, hemoptysis; No shortness of breath  CARDIOVASCULAR: No palpitations  GASTROINTESTINAL: No nausea, vomiting, or hematemesis; No diarrhea or constipation. No melena or hematochezia.  GENITOURINARY: No dysuria, frequency or hematuria  NEUROLOGICAL: No numbness or weakness  SKIN: No itching, burning, rashes, or lesions   All other review of systems is negative unless indicated above    Vital Signs Last 24 Hrs  T(C): 36.7 (20 May 2019 05:52), Max: 36.8 (19 May 2019 22:50)  T(F): 98.1 (20 May 2019 05:52), Max: 98.2 (19 May 2019 22:50)  HR: 81 (20 May 2019 05:52) (51 - 81)  BP: 131/70 (20 May 2019 05:52) (131/70 - 180/90)  BP(mean): --  RR: 18 (20 May 2019 03:17) (16 - 18)  SpO2: 95% (20 May 2019 05:52) (95% - 98%)        PHYSICAL EXAM:   Constitutional: NAD, awake and alert, well-developed  HEENT: PERR, EOMI, Normal Hearing, MMM  Neck: Soft and supple, No LAD, No JVD  Respiratory: Breath sounds are clear bilaterally, No wheezing, rales or rhonchi  Cardiovascular: S1 and S2, regular rate and rhythm, soft KALEY at LLSB and base as before, no gallops or rubs  Gastrointestinal: Bowel Sounds present, soft, nontender, nondistended, no guarding, no rebound  Extremities: No peripheral edema  Vascular: 2+ peripheral pulses  Neurological: A/O x 3, no focal deficits  Musculoskeletal: 5/5 strength b/l upper and lower extremities  Skin: No rashes      MEDICATIONS  (STANDING):  aspirin enteric coated 81 milliGRAM(s) Oral daily  hydrALAZINE 10 milliGRAM(s) Oral every 6 hours  levothyroxine 125 MICROGram(s) Oral daily      LABS: All Labs Reviewed:                        12.6   8.14  )-----------( 149      ( 20 May 2019 05:56 )             39.0     05-20    145  |  108  |  18  ----------------------------<  101<H>  3.0<L>   |  28  |  0.97    Ca    9.2      20 May 2019 05:56  Mg     2.3     05-19    TPro  6.5  /  Alb  3.6  /  TBili  0.8  /  DBili  x   /  AST  78<H>  /  ALT  186<H>  /  AlkPhos  101  05-20    PT/INR - ( 19 May 2019 17:21 )   PT: 11.6 sec;   INR: 1.04 ratio           CARDIAC MARKERS ( 19 May 2019 21:04 )  <0.015 ng/mL / x     / x     / x     / x      CARDIAC MARKERS ( 19 May 2019 17:21 )  <0.015 ng/mL / x     / x     / x     / x          Serum Pro-Brain Natriuretic Peptide: 753 pg/mL (05-19 @ 21:04)  Serum Pro-Brain Natriuretic Peptide: 742 pg/mL (05-19 @ 17:21)    BLOOD CULTURES:   LIPID PROFILE     RADIOLOGY:  Abd & Pelvis U/S: 5/20/19:  FINDINGS:    Pleural space: Small right pleural effusion.    Liver: Limited evaluation of the liver secondary to bowel gas in place technique. Increased echogenicity of the hepatic parenchyma with normal echotexture suggesting hepatic steatosis. No definite focal mass identified.   Gallbladder: No gallstones. No gallbladder wall thickening.    Common bile duct: No biliary dilatation.   Pancreas: Visualized pancreas is unremarkable.    Right kidney: Incidental small right renal cyst. No hydronephrosis.    Left kidney: Prior left nephrectomy.    Spleen: Within normal limits.    Aorta:. Visualized aspects measuring no aneurysmal dilatation.   Inferior vena cava: Within normal limits.  IMPRESSION:   Findings suggesting hepatic steatosis.  Small right pleural effusion.  [facility]      Venous Doppler U/S- 5/20/19:  1. Superficial edema.   2. No DVT.      EKG:      TELEMETRY:  NSR/sinus bradycardia, APC's    ECHO:

## 2019-05-20 NOTE — PATIENT PROFILE ADULT - NSSTREETDRUGUSE_GEN_A_NUR
Ronel Mandujano is a 61 y.o. female  Follow-up history of subacute CVA (multiple densities right basal ganglia, corona radiata, left centrum semiovale, and possible left anterior corpus callosum) incidental finding workup for acute visual changes (primarily ocular pathology)  On asa and statin with BP control. At some point she stopped taking aspirin on her own. Follow-up hypertension on CCB    Colon cancer screening: can't see well enough to complete FIT on her own      Patient Care Team:  Christopher Kent MD as PCP - General (Family Practice)  Ana Montana MD (Ophthalmology)  Allergies   Allergen Reactions    Amoxicillin Unknown (comments)     Outpatient Prescriptions Marked as Taking for the 2/27/18 encounter (Office Visit) with Christopher Kent MD   Medication Sig Dispense Refill    albuterol (PROVENTIL VENTOLIN) 2.5 mg /3 mL (0.083 %) nebulizer solution INHALE 1 VIAL VIA NEBULIZER EVERY 4 HOURS AS NEEDED FOR WHEEZING 75 Each 1    albuterol (PROVENTIL HFA, VENTOLIN HFA, PROAIR HFA) 90 mcg/actuation inhaler Take 2 Puffs by inhalation every four (4) hours as needed for Wheezing or Shortness of Breath. 2 Inhaler 2    atorvastatin (LIPITOR) 40 mg tablet TAKE ONE TABLET BY MOUTH ONE TIME DAILY  30 Tab 0    inhalational spacing device Use as directed with albuterol inhaler. 1 Device 1    amLODIPine (NORVASC) 10 mg tablet Take 1 Tab by mouth daily. 90 Tab 4    beclomethasone (QVAR) 80 mcg/actuation aero Take 2 Puffs by inhalation two (2) times a day. 3 Inhaler 6    ipratropium (ATROVENT) 0.02 % nebulizer solution 2.5 mL by Nebulization route two (2) times a day. May mix with albuterol nebulizer solution. 62.5 mL 2    cetirizine (ZYRTEC) 10 mg tablet Take 1 Tab by mouth daily. 90 Tab 4    montelukast (SINGULAIR) 10 mg tablet Take 1 Tab by mouth daily. 90 Tab 4     Patient Active Problem List    Diagnosis    History of HPV infection     12/2016: normal pap.  Repeat co testing 12/2017 normal with neg HPV. Plan pap 2020. Likely last.      High risk sexual behavior    Acute exacerbation of COPD with asthma ([facility] [address]    Pulmonary emphysema (HCC)     Francisco Walters MD - 07/28/2017 12:00 AM EDT      INDICATIONS: PFT was done for evaluation of asthma. DEMOGRAPHICS: Patient's height is 61 inches, weight 115 pounds, BMI of 22. PROCEDURE: Spirometry demonstrates a normal FEV1/FVC ratio of 72%. The FEV1 is 1.10 L or 50% of predicted. The FVC is 1.53 L or 54% of predicted. Lung volumes demonstrate hyperinflation based on increased RV/TLC ratio. Total lung capacity is 5.27 L or 114% of predicted, and the residual volume is 3.95 L or 205% of predicted. Diffusion capacity is slightly reduced at 67% of predicted; however, this vital capacity does not meet ATS criteria. Therefore, it may be falsely low. Patient does have difficulty with testing overall. IMPRESSION:  1. Normal spirometry. 2.Hyperinflation. 3.Slight reduction in the diffusion capacity. 4.Results are difficult to interpret due to poor effort. Clinical correlation will be recommended.  CRAO (central retinal artery occlusion), left     Last Assessment & Plan:   1 yr h/o CRAO, W/U negative according to pt. No further evaluation indicated. Had [facility] for glaucoma.  Primary open angle glaucoma of right eye, severe stage     Last Assessment & Plan:   IOP excellent without gtts. As per Tracy Noble, still no meds indicated.   F/U 4 mos-VF      Asthma, chronic    History of CVA (cerebrovascular accident)     multiple densities right basal ganglia, corona radiata, left centrum semiovale, and possible left anterior corpus callosum) incidental finding workup for acute visual changes (primarily ocular pathology)      Glaucoma    Essential hypertension    Tobacco use     Past Medical History:   Diagnosis Date    Blind right eye     Glaucoma     Hypertension      Past Surgical History:   Procedure Laterality Date    ABDOMEN SURGERY [address] UNLISTED      HX APPENDECTOMY  1978    HX CATARACT REMOVAL Bilateral 2009    HX TUBAL LIGATION  1978     Family History   Problem Relation Age of Onset    Hypertension Mother    Tj Aly Neg Hx     Diabetes Neg Hx     Colon Cancer Neg Hx     Coronary Artery Disease Neg Hx     Breast Cancer Neg Hx      Social History     Social History    Marital status: SINGLE     Spouse name: N/A    Number of children: N/A    Years of education: N/A     Occupational History    Not on file. Social History Main Topics    Smoking status: Current Every Day Smoker     Packs/day: 0.25     Years: 45.00    Smokeless tobacco: Never Used    Alcohol use No    Drug use: Not on file    Sexual activity: Not on file     Other Topics Concern    Not on file     Social History Narrative         Review of Systems   Cardiovascular: Negative for chest pain and leg swelling. No orthopnea, no PND   Neurological: Negative for facial asymmetry, speech difficulty, weakness and headaches. Visit Vitals    /50    Pulse 78    Temp 98 °F (36.7 °C) (Oral)    Resp 10    Ht 5' 1\" (1.549 m)    Wt 124 lb (56.2 kg)    SpO2 97%    BMI 23.43 kg/m2     Physical Exam   Constitutional: She is oriented to person, place, and time. She appears well-developed and well-nourished. No distress. Pleasant black female   HENT:   Head: Normocephalic and atraumatic. Pulmonary/Chest: Effort normal.   Neurological: She is alert and oriented to person, place, and time. No gross deficits outside of reported vision loss (not formally assessed today)   Psychiatric: She has a normal mood and affect.  Her behavior is normal. Judgment and thought content normal.     Results for orders placed or performed in visit on 01/16/18   URINALYSIS W/ RFLX MICROSCOPIC   Result Value Ref Range    Specific Gravity 1.012 1.005 - 1.030    pH (UA) 6.5 5.0 - 7.5    Color Yellow Yellow    Appearance Clear Clear    Leukocyte Esterase Trace (A) Negative Protein Negative Negative/Trace    Glucose Negative Negative    Ketone Negative Negative    Blood Negative Negative    Bilirubin Negative Negative    Urobilinogen 1.0 0.2 - 1.0 mg/dL    Nitrites Negative Negative    Microscopic Examination See additional order    MICROSCOPIC EXAMINATION   Result Value Ref Range    WBC 0-5 0 - 5 /hpf    RBC 0-2 0 - 2 /hpf    Epithelial cells 0-10 0 - 10 /hpf    Casts None seen None seen /lpf    Mucus Present Not Estab. Bacteria Few None seen/Few   METABOLIC PANEL, BASIC   Result Value Ref Range    Glucose 84 65 - 99 mg/dL    BUN 11 8 - 27 mg/dL    Creatinine 0.83 0.57 - 1.00 mg/dL    GFR est non-AA 75 >59 mL/min/1.73    GFR est AA 86 >59 mL/min/1.73    BUN/Creatinine ratio 13 12 - 28    Sodium 148 (H) 134 - 144 mmol/L    Potassium 4.9 3.5 - 5.2 mmol/L    Chloride 106 96 - 106 mmol/L    CO2 26 18 - 29 mmol/L    Calcium 9.6 8.7 - 10.3 mg/dL   LIPID PANEL W/ CHOL/HDL RATIO   Result Value Ref Range    Cholesterol, total 153 100 - 199 mg/dL    Triglyceride 117 0 - 149 mg/dL    HDL Cholesterol 51 >39 mg/dL    VLDL, calculated 23 5 - 40 mg/dL    LDL, calculated 79 0 - 99 mg/dL    T. Chol/HDL Ratio 3.0 0.0 - 4.4 ratio units   CVD REPORT   Result Value Ref Range    INTERPRETATION Note    SPECIMEN STATUS REPORT   Result Value Ref Range    SPECIMEN STATUS REPORT COMMENT      Diagnoses and all orders for this visit:    1. History of CVA (cerebrovascular accident)  Assessment & Plan:          Orders:  -     atorvastatin (LIPITOR) 40 mg tablet; TAKE ONE TABLET BY MOUTH ONE TIME DAILY  -     aspirin delayed-release 81 mg tablet; Take 1 Tab by mouth daily. 2. Essential hypertension  -     amLODIPine (NORVASC) 10 mg tablet; Take 1 Tab by mouth daily. 3. Screen for colon cancer  Will come to the office for assistance with collection of FIT    Unless otherwise stated, conditions above are stable or improved and well controlled, with a plan to continue present management.     The patient understands our medical plan. Alternatives have been explained and offered. All questions have been addressed. The patient  is to call if her condition worsens or fails to improve or if significant side effects are experienced. Follow-up Disposition:  Return in about 1 year (around 2/27/2019) for cholesterol and blood pressure follow up, non fasting labs 1 week prior. Dragon medical dictation software was used for portions of this report. Unintended voice recognition errors may occur. never used

## 2019-05-21 ENCOUNTER — TRANSCRIPTION ENCOUNTER (OUTPATIENT)
Age: 79
End: 2019-05-21

## 2019-05-21 VITALS
OXYGEN SATURATION: 95 % | RESPIRATION RATE: 17 BRPM | SYSTOLIC BLOOD PRESSURE: 149 MMHG | HEART RATE: 74 BPM | DIASTOLIC BLOOD PRESSURE: 70 MMHG | TEMPERATURE: 98 F

## 2019-05-21 LAB
ANION GAP SERPL CALC-SCNC: 5 MMOL/L — SIGNIFICANT CHANGE UP (ref 5–17)
ANION GAP SERPL CALC-SCNC: 8 MMOL/L — SIGNIFICANT CHANGE UP (ref 5–17)
BUN SERPL-MCNC: 19 MG/DL — SIGNIFICANT CHANGE UP (ref 7–23)
BUN SERPL-MCNC: 22 MG/DL — SIGNIFICANT CHANGE UP (ref 7–23)
CALCIUM SERPL-MCNC: 8.4 MG/DL — LOW (ref 8.5–10.1)
CALCIUM SERPL-MCNC: 8.6 MG/DL — SIGNIFICANT CHANGE UP (ref 8.5–10.1)
CHLORIDE SERPL-SCNC: 105 MMOL/L — SIGNIFICANT CHANGE UP (ref 96–108)
CHLORIDE SERPL-SCNC: 106 MMOL/L — SIGNIFICANT CHANGE UP (ref 96–108)
CO2 SERPL-SCNC: 28 MMOL/L — SIGNIFICANT CHANGE UP (ref 22–31)
CO2 SERPL-SCNC: 28 MMOL/L — SIGNIFICANT CHANGE UP (ref 22–31)
CREAT SERPL-MCNC: 0.92 MG/DL — SIGNIFICANT CHANGE UP (ref 0.5–1.3)
CREAT SERPL-MCNC: 1.08 MG/DL — SIGNIFICANT CHANGE UP (ref 0.5–1.3)
GLUCOSE SERPL-MCNC: 85 MG/DL — SIGNIFICANT CHANGE UP (ref 70–99)
GLUCOSE SERPL-MCNC: 96 MG/DL — SIGNIFICANT CHANGE UP (ref 70–99)
POTASSIUM SERPL-MCNC: 3.1 MMOL/L — LOW (ref 3.5–5.3)
POTASSIUM SERPL-MCNC: 4.3 MMOL/L — SIGNIFICANT CHANGE UP (ref 3.5–5.3)
POTASSIUM SERPL-SCNC: 3.1 MMOL/L — LOW (ref 3.5–5.3)
POTASSIUM SERPL-SCNC: 4.3 MMOL/L — SIGNIFICANT CHANGE UP (ref 3.5–5.3)
SODIUM SERPL-SCNC: 139 MMOL/L — SIGNIFICANT CHANGE UP (ref 135–145)
SODIUM SERPL-SCNC: 141 MMOL/L — SIGNIFICANT CHANGE UP (ref 135–145)

## 2019-05-21 RX ORDER — POTASSIUM CHLORIDE 20 MEQ
40 PACKET (EA) ORAL EVERY 4 HOURS
Refills: 0 | Status: COMPLETED | OUTPATIENT
Start: 2019-05-21 | End: 2019-05-21

## 2019-05-21 RX ORDER — POTASSIUM CHLORIDE 20 MEQ
2 PACKET (EA) ORAL
Qty: 10 | Refills: 0
Start: 2019-05-21 | End: 2019-05-25

## 2019-05-21 RX ORDER — LEVOTHYROXINE SODIUM 125 MCG
1 TABLET ORAL
Qty: 0 | Refills: 0 | DISCHARGE
Start: 2019-05-21

## 2019-05-21 RX ORDER — HYDRALAZINE HCL 50 MG
1 TABLET ORAL
Qty: 30 | Refills: 0
Start: 2019-05-21 | End: 2019-06-04

## 2019-05-21 RX ORDER — PANTOPRAZOLE SODIUM 20 MG/1
1 TABLET, DELAYED RELEASE ORAL
Qty: 5 | Refills: 0
Start: 2019-05-21 | End: 2019-05-25

## 2019-05-21 RX ORDER — PANTOPRAZOLE SODIUM 20 MG/1
1 TABLET, DELAYED RELEASE ORAL
Qty: 15 | Refills: 0
Start: 2019-05-21 | End: 2019-06-04

## 2019-05-21 RX ORDER — ACETAMINOPHEN 500 MG
650 TABLET ORAL EVERY 6 HOURS
Refills: 0 | Status: DISCONTINUED | OUTPATIENT
Start: 2019-05-21 | End: 2019-05-21

## 2019-05-21 RX ADMIN — Medication 125 MICROGRAM(S): at 06:00

## 2019-05-21 RX ADMIN — Medication 40 MILLIEQUIVALENT(S): at 11:39

## 2019-05-21 RX ADMIN — Medication 40 MILLIEQUIVALENT(S): at 13:24

## 2019-05-21 RX ADMIN — Medication 25 MILLIGRAM(S): at 11:39

## 2019-05-21 RX ADMIN — Medication 650 MILLIGRAM(S): at 08:56

## 2019-05-21 RX ADMIN — Medication 25 MILLIGRAM(S): at 00:11

## 2019-05-21 RX ADMIN — Medication 25 MILLIGRAM(S): at 06:00

## 2019-05-21 RX ADMIN — PANTOPRAZOLE SODIUM 40 MILLIGRAM(S): 20 TABLET, DELAYED RELEASE ORAL at 06:00

## 2019-05-21 RX ADMIN — Medication 40 MILLIEQUIVALENT(S): at 08:51

## 2019-05-21 NOTE — PROGRESS NOTE ADULT - ASSESSMENT
5/20/19:  Pt with above history and somewhat atypical chest and abd pains since taking steroid.  Possible GI source bur should r/o underlying ischemia.  She refuses a nuclear stress test but will do a stress echo.  Continue to teat her BP as per medicine etal.  Agree with Hydralazine for now given her 1 kidney.  If stress echo is (-) can consider discharge home with outpt follow-up.  With possible GI source of pain, would hold off on ASA for now unless stress echo (+).  Will follow.    5/21/19:  Remains anxious but no anginal chest pains.  Stress echo reportedly (-) for obvious ischemia.  Eager to go home since she cannot sleep well in the hospital.  BP slightly high and labile but better on her current meds.  Would consider changing the Hydralazine to 50 mg bid for better compliance and discharge home when ok with medicine with outpt follow up with her PCP and me.  continue to replete K+ and follow up labs as an outpt.  Will follow as an outpt prn.

## 2019-05-21 NOTE — DISCHARGE NOTE PROVIDER - CARE PROVIDER_API CALL
José Miguel Roper)  Critical Care Medicine; Internal Medicine; Pulmonary Disease; Sleep Medicine  [address] [place] [address]  Phone: [phone]  Fax: [phone]  Follow Up Time:

## 2019-05-21 NOTE — DISCHARGE NOTE PROVIDER - HOSPITAL COURSE
78y F PMHx hypothyroidism, anxiety, presents to ED for chest pain.     Pain started suddenly today while she was sitting, describes it as burning pain  pain5/10,localized to the precordium, non radiating. Pain was continous and stayed the same intensity. No alleviating/ exacerbating factors. Over the next hour the pain, the pain spontaneously resolved on its own- BP at the time was 200/110 prompting her to come to the ED. This accompanied with shortness of breath . Last couple of days has been having progressively increasing swelling of legs. No associated chest pressure, palpitations.    In the ED, continued to have hypertension.    Was given Lasix 40 mg and then Hydralazine 5 mg.     Last 2 weeks she has been having productive cough, of white sputum and was prescribed  prednisone pack. She had completed 3/6 days. ( 16 mg today)    In the past has been worked up for cardiac ischemia but results have been negative .Patient seen and evaluated by the cardiology and Stress test negative for ischemia and Evaluated by the pulmonologist CT chest showed bilateral trace pleural effusion Left greater than right and bibasilar atelectasis .Her Blood pressure have been in 140-160 range while at hospital stay and hydralazine increased to 25 q 6 hrs and recommended by cardiology 50 twice a day for better compliance .         5/21 :        PHYSICAL EXAM:    GENERAL: NAD, well-groomed, well-developed    HEAD:  Atraumatic, Normocephalic    EYES: EOMI, PERRLA, conjunctiva and sclera clear    ENMT: No tonsillar erythema, exudates, or enlargement; Moist mucous membranes, Good dentition, No lesions    NECK: Supple, No JVD, Normal thyroid    NERVOUS SYSTEM:  Alert & Oriented X3, Good concentration; Motor Strength 5/5 B/L upper and lower extremities; DTRs 2+ intact and symmetric    CHEST/LUNG: Clear to percussion bilaterally; No rales, rhonchi, wheezing, or rubs    HEART: Regular rate and rhythm; No murmurs, rubs, or gallops    ABDOMEN: Soft, Nontender, Nondistended; Bowel sounds present    EXTREMITIES:  2+ Peripheral Pulses, No clubbing, cyanosis, or edema    LYMPH: No lymphadenopathy noted    SKIN: No rashes or lesions                                12.6     8.14  )-----------( 149      ( 20 May 2019 05:56 )               39.0         21 May 2019 05:53        141    |  105    |  19       ----------------------------<  85       3.1     |  28     |  0.92         Ca    8.4        21 May 2019 05:53        < from: CT Chest No Cont (05.20.19 @ 14:36) >        IMPRESSION:         *  Trace pleural effusion, left greater than right.    *  Bibasilar subsegmental atelectasis. 78y F PMHx hypothyroidism, anxiety, presents to ED for chest pain.     Pain started suddenly today while she was sitting, describes it as burning pain  pain5/10,localized to the precordium, non radiating. Pain was continous and stayed the same intensity. No alleviating/ exacerbating factors. Over the next hour the pain, the pain spontaneously resolved on its own- BP at the time was 200/110 prompting her to come to the ED. This accompanied with shortness of breath . Last couple of days has been having progressively increasing swelling of legs. No associated chest pressure, palpitations.    In the ED, continued to have hypertension.    Was given Lasix 40 mg and then Hydralazine 5 mg.     Last 2 weeks she has been having productive cough, of white sputum and was prescribed  prednisone pack. She had completed 3/6 days. ( 16 mg today)    In the past has been worked up for cardiac ischemia but results have been negative .Patient seen and evaluated by the cardiology and Stress test negative for ischemia and Evaluated by the pulmonologist CT chest showed bilateral trace pleural effusion Left greater than right and bibasilar atelectasis .Her Blood pressure have been in 140-160 range while at hospital stay and hydralazine increased to 25 q 6 hrs and recommended by cardiology 50 twice a day for better compliance.        5/21:Patient seen ane examined at the bedside .No overnight events in the telemetry .Patient states she is doing better chest pain resolved .Felt better with the xanax overnight .Will follow up with Dr Roper next week and will see PCP recommended by him .Advised patient to check BP at home regularly and have work up for the secondary causes of the hypertension and recheck the Potassium  .                    PHYSICAL EXAM:    GENERAL: Obese     HEAD:  Atraumatic, Normocephalic    EYES: EOMI, PERRLA, conjunctiva and sclera clear    ENMT: No tonsillar erythema, exudates, or enlargement; Moist mucous membranes, Good dentition, No lesions    NECK: Supple, No JVD, Normal thyroid    NERVOUS SYSTEM:  Alert & Oriented X3, Good concentration; Motor Strength 5/5 B/L upper and lower extremities; DTRs 2+ intact and symmetric    CHEST/LUNG: Clear to percussion bilaterally; No rales, rhonchi, wheezing, or rubs    HEART: Regular rate and rhythm; No murmurs, rubs, or gallops    ABDOMEN: Soft, Nontender, Nondistended; Bowel sounds present    EXTREMITIES:  2+ edema     LYMPH: No lymphadenopathy noted    SKIN: No rashes or lesions                                12.6     8.14  )-----------( 149      ( 20 May 2019 05:56 )               39.0         21 May 2019 05:53        141    |  105    |  19       ----------------------------<  85       3.1     |  28     |  0.92         Ca    8.4        21 May 2019 05:53        < from: CT Chest No Cont (05.20.19 @ 14:36) >        IMPRESSION:         *  Trace pleural effusion, left greater than right.    *  Bibasilar subsegmental atelectasis.

## 2019-05-21 NOTE — PROGRESS NOTE ADULT - ASSESSMENT
PROBLEMS:    Chest pain likely secondary to hypertensive urgency  Viral Syndrome- costochondritis with viral reactivetivety with Sinus Bradycardia r/o medication induced ( pt on Propranolol ) vs ischemia  Trace pleural effusion- pleuro-pericarditis  basilar atelectasis  lower extremity edema   unilateral kidney  hypothyroidism    PLAN;    PULMONARY STABLE DECD PLANNING TO FU IN OFFICE  SYMTOMATIC RX  CT CHEST REVIEWED  SUPPORTIVE CARE  DVT PROPHLASIX

## 2019-05-21 NOTE — PROGRESS NOTE ADULT - SUBJECTIVE AND OBJECTIVE BOX
CHIEF COMPLAINT:  Patient is a 78y old  Female who presents with a chief complaint of Chest pain (19 May 2019 22:05)      HPI: 5/20/19:  78y F with PMHx hypothyroidism, anxiety, presents to ED for chest pain.  Pain started suddenly on 5/19/19 while she was sitting, describes it as burning pain 5/10, localized to the precordium, non radiating. Pain was continuos and stayed the same intensity. No alleviating/ exacerbating factors. Over the next hour the pain, the pain spontaneously resolved on its own- BP at the time was 200/110 prompting her to come to the ED. This accompanied with shortness of breath. Last couple of days has been having progressively increasing swelling of legs. No associated chest pressure, palpitations.  She's been suffering with ? bronchitis treated by Dr Mcmanus at the [facility], initially with antibiotics a few months ago and then with Steroids 1 week ago.  She has no prior h/o HTN, DM but has had borderline elevated lipids but not on treatment.  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  In the ED, continued to have hypertension.  D-Dimer was mildly elevate but doppler of legs (-).  Was given Lasix 40 mg and then Hydralazine 5 mg. Last 2 weeks she has been having productive cough, of white sputum and was prescribed  prednisone pack. She had completed 3/6 days. ( 16 mg today)  In the past has been worked up for cardiac ischemia but results have been negative. 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      5/21/19:  Remains anxious but no anginal chest pains.  Stress echo reportedly (-) for obvious ischemia.  Eager to go home since she cannot sleep well in the hospital.  BP slightly high and labile but better on her current meds.        PMHx:  PAST MEDICAL & SURGICAL HISTORY:  Arrhythmia  Hypothyroidism  H/O unilateral nephrectomy      FAMILY HISTORY:   FAMILY HISTORY:  Family history of sinus bradycardia      ALLERGIES:  Allergies  Augmentin (Swelling)  ciprofloxacin (Unknown)  IV Contrast (Unknown)  Vicodin (Unknown)      REVIEW OF SYSTEMS:    CONSTITUTIONAL: No weakness, fevers or chills  EYES/ENT: No visual changes;  No vertigo or throat pain   NECK: No pain or stiffness  RESPIRATORY: No cough, wheezing, hemoptysis; No shortness of breath  CARDIOVASCULAR: No palpitations  GASTROINTESTINAL: No nausea, vomiting, or hematemesis; No diarrhea or constipation. No melena or hematochezia.  GENITOURINARY: No dysuria, frequency or hematuria  NEUROLOGICAL: No numbness or weakness  SKIN: No itching, burning, rashes, or lesions   All other review of systems is negative unless indicated above    Vital Signs Last 24 Hrs  T(C): 37.1 (21 May 2019 04:55), Max: 37.1 (21 May 2019 04:55)  T(F): 98.7 (21 May 2019 04:55), Max: 98.7 (21 May 2019 04:55)  HR: 76 (21 May 2019 04:55) (59 - 76)  BP: 153/65 (21 May 2019 04:55) (147/69 - 163/78)  BP(mean): --  RR: 18 (21 May 2019 04:55) (18 - 18)  SpO2: 97% (21 May 2019 04:55) (96% - 98%)      PHYSICAL EXAM:   Constitutional: NAD, awake and alert, well-developed  HEENT: PERR, EOMI, Normal Hearing, MMM  Neck: Soft and supple, No LAD, No JVD  Respiratory: Breath sounds are clear bilaterally, No wheezing, rales or rhonchi  Cardiovascular: S1 and S2, regular rate and rhythm, soft KALEY at LLSB and base as before, no gallops or rubs  Gastrointestinal: Bowel Sounds present, soft, nontender, nondistended, no guarding, no rebound  Extremities: No peripheral edema  Vascular: 2+ peripheral pulses  Neurological: A/O x 3, no focal deficits  Musculoskeletal: 5/5 strength b/l upper and lower extremities  Skin: No rashes      MEDICATIONS  (STANDING):  hydrALAZINE 25 milliGRAM(s) Oral every 6 hours  levothyroxine 125 MICROGram(s) Oral daily  pantoprazole    Tablet 40 milliGRAM(s) Oral before breakfast      LABS: All Labs Reviewed:                        12.6   8.14  )-----------( 149      ( 20 May 2019 05:56 )             39.0       05-21    141  |  105  |  19  ----------------------------<  85  3.1<L>   |  28  |  0.92      05-20    145  |  108  |  18  ----------------------------<  101<H>  3.0<L>   |  28  |  0.97      Ca    8.4<L>      21 May 2019 05:53  Ca    9.2      20 May 2019 05:56    Mg     2.2     05-20  Mg     2.3     05-19    TPro  6.5  /  Alb  3.6  /  TBili  0.8  /  DBili  x   /  AST  78<H>  /  ALT  186<H>  /  AlkPhos  101  05-20    PT/INR - ( 19 May 2019 17:21 )   PT: 11.6 sec;   INR: 1.04 ratio           CARDIAC MARKERS ( 19 May 2019 21:04 )  <0.015 ng/mL / x     / x     / x     / x      CARDIAC MARKERS ( 19 May 2019 17:21 )  <0.015 ng/mL / x     / x     / x     / x          Serum Pro-Brain Natriuretic Peptide: 753 pg/mL (05-19 @ 21:04)  Serum Pro-Brain Natriuretic Peptide: 742 pg/mL (05-19 @ 17:21)    BLOOD CULTURES:   LIPID PROFILE     RADIOLOGY:    CT of Chest: 5/20/19:  FINDINGS:  LUNGS AND LARGE AIRWAYS: Patent central airways.  Subsegmental atelectasis at the lung bases. No focal consolidation or overt pulmonary edema. Mild air trapping.  PLEURA: Trace bilateral pleural effusion, left greater than right.  VESSELS: Within normal limits.  HEART: Heart size is normal. No pericardial effusion.  MEDIASTINUM AND RIGOBERTO: No lymphadenopathy.  CHEST WALL AND LOWER NECK: Within normal limits.  VISUALIZED UPPER ABDOMEN: Fatty atrophy of the pancreas.  BONES: Degenerative changes of the spine.  IMPRESSION:   *  Trace pleural effusion, left greater than right.  *  Bibasilar subsegmental atelectasis.      Abd & Pelvis U/S: 5/20/19:  FINDINGS:    Pleural space: Small right pleural effusion.    Liver: Limited evaluation of the liver secondary to bowel gas in place technique. Increased echogenicity of the hepatic parenchyma with normal echotexture suggesting hepatic steatosis. No definite focal mass identified.   Gallbladder: No gallstones. No gallbladder wall thickening.    Common bile duct: No biliary dilatation.   Pancreas: Visualized pancreas is unremarkable.    Right kidney: Incidental small right renal cyst. No hydronephrosis.    Left kidney: Prior left nephrectomy.    Spleen: Within normal limits.    Aorta:. Visualized aspects measuring no aneurysmal dilatation.   Inferior vena cava: Within normal limits.  IMPRESSION:   Findings suggesting hepatic steatosis.  Small right pleural effusion.  A pulmonary report was given by the [facility] radiologist      Venous Doppler U/S- 5/20/19:  1. Superficial edema.   2. No DVT.      EKG:      TELEMETRY:  NSR/sinus bradycardia, APC's    ECHO:

## 2019-05-21 NOTE — DISCHARGE NOTE NURSING/CASE MANAGEMENT/SOCIAL WORK - NSDCDPATPORTLINK_GEN_ALL_CORE
You can access the [facility] Patient Portal, offered by [facility] [place], by registering with the following website: [email or web address]

## 2019-05-21 NOTE — PROGRESS NOTE ADULT - SUBJECTIVE AND OBJECTIVE BOX
Subjective:    pat better, lying in bed, no respiratory complaint.    Home Medications:  levothyroxine 125 mcg (0.125 mg) oral tablet: 1 tab(s) orally once a day (21 May 2019 06:58)    MEDICATIONS  (STANDING):  hydrALAZINE 25 milliGRAM(s) Oral every 6 hours  levothyroxine 125 MICROGram(s) Oral daily  pantoprazole    Tablet 40 milliGRAM(s) Oral before breakfast    MEDICATIONS  (PRN):  acetaminophen   Tablet .. 650 milliGRAM(s) Oral every 6 hours PRN Mild Pain (1 - 3)      Allergies    Augmentin (Swelling)  ciprofloxacin (Unknown)  IV Contrast (Unknown)  Vicodin (Unknown)    Intolerances        Vital Signs Last 24 Hrs  T(C): 36.6 (21 May 2019 10:55), Max: 37.1 (21 May 2019 04:55)  T(F): 97.9 (21 May 2019 10:55), Max: 98.7 (21 May 2019 04:55)  HR: 74 (21 May 2019 10:55) (59 - 76)  BP: 149/70 (21 May 2019 10:55) (147/69 - 158/64)  BP(mean): --  RR: 17 (21 May 2019 10:55) (17 - 18)  SpO2: 95% (21 May 2019 10:55) (95% - 98%)      PHYSICAL EXAMINATION:    NECK:  Supple. No lymphadenopathy. Jugular venous pressure not elevated. Carotids equal.   HEART:   The cardiac impulse has a normal quality. Reg., Nl S1 and S2.  There are no murmurs, rubs or gallops noted  CHEST:  Chest crackles to auscultation. Normal respiratory effort.  ABDOMEN:  Soft and nontender.   EXTREMITIES:  There is no edema.       LABS:                        12.6   8.14  )-----------( 149      ( 20 May 2019 05:56 )             39.0     05-21    141  |  105  |  19  ----------------------------<  85  3.1<L>   |  28  |  0.92    Ca    8.4<L>      21 May 2019 05:53  Mg     2.2     05-20    TPro  6.5  /  Alb  3.6  /  TBili  0.8  /  DBili  x   /  AST  78<H>  /  ALT  186<H>  /  AlkPhos  101  05-    PT/INR - ( 19 May 2019 17:21 )   PT: 11.6 sec;   INR: 1.04 ratio           Urinalysis Basic - ( 19 May 2019 17:21 )    Color: Yellow / Appearance: Clear / S.005 / pH: x  Gluc: x / Ketone: Negative  / Bili: Negative / Urobili: Negative mg/dL   Blood: x / Protein: 100 mg/dL / Nitrite: Negative   Leuk Esterase: Negative / RBC: 0-2 /HPF / WBC 0-2   Sq Epi: x / Non Sq Epi: Negative / Bacteria: Occasional      CT Chest No Cont (19 @ 14:36) >  IMPRESSION:     *  Trace pleural effusion, left greater than right.  *  Bibasilar subsegmental atelectasis.

## 2019-05-24 DIAGNOSIS — M94.0 CHONDROCOSTAL JUNCTION SYNDROME [TIETZE]: ICD-10-CM

## 2019-05-24 DIAGNOSIS — R00.1 BRADYCARDIA, UNSPECIFIED: ICD-10-CM

## 2019-05-24 DIAGNOSIS — E87.6 HYPOKALEMIA: ICD-10-CM

## 2019-05-24 DIAGNOSIS — F41.9 ANXIETY DISORDER, UNSPECIFIED: ICD-10-CM

## 2019-05-24 DIAGNOSIS — J90 PLEURAL EFFUSION, NOT ELSEWHERE CLASSIFIED: ICD-10-CM

## 2019-05-24 DIAGNOSIS — B34.9 VIRAL INFECTION, UNSPECIFIED: ICD-10-CM

## 2019-05-24 DIAGNOSIS — R07.9 CHEST PAIN, UNSPECIFIED: ICD-10-CM

## 2019-05-24 DIAGNOSIS — R74.0 NONSPECIFIC ELEVATION OF LEVELS OF TRANSAMINASE AND LACTIC ACID DEHYDROGENASE [LDH]: ICD-10-CM

## 2019-05-24 DIAGNOSIS — I16.0 HYPERTENSIVE URGENCY: ICD-10-CM

## 2019-05-24 DIAGNOSIS — E03.9 HYPOTHYROIDISM, UNSPECIFIED: ICD-10-CM

## 2019-05-29 PROCEDURE — 93350 STRESS TTE ONLY: CPT | Mod: 26

## 2021-08-23 NOTE — ED STATDOCS - CROS ED MARK PERT SYS NEG
graeme all pertinent systems negative Eye Protection Verbiage: Before proceeding with the stage, a plastic scleral shield was inserted. The globe was anesthetized with a few drops of 1% lidocaine with 1:100,000 epinephrine. Then, an appropriate sized scleral shield was chosen and coated with lacrilube ointment. The shield was gently inserted and left in place for the duration of each stage. After the stage was completed, the shield was gently removed.

## 2022-09-15 NOTE — ED ADULT NURSE NOTE - EXTENSIONS OF SELF_ADULT
"[facility]   Physical Therapy Treatment Note    Name: Mikie Barron  Clinic Number: [id number]    Therapy Diagnosis:   Encounter Diagnoses   Name Primary?    Knee joint stiffness, bilateral Yes    Balance disorder          Physician: Abundio Hoffman, *    Visit Date: 9/15/2022    Physician Orders: PT Eval and Treat  Medical Diagnosis from Referral: M25.669 (ICD-10-CM) - Knee stiffness, unspecified laterality  Evaluation Date: 7/11/2022  Authorization Period Expiration: 12/31/2022  Plan of Care Expiration: 10/11/2022  Progress Note Due: 10/08/2022  Visit # / Visits authorized: 12/ 40   FOTO: 2/ 3  Scan QR Code to start FOTO survey       Precautions: Standard    PTA Visit #: 1/5     Time In: 12:00 pm  Time Out: 12:45 pm  Total Billable Time: 45 minutes    SUBJECTIVE     Pt reports: "I'm not stiff, but I'm a little sore today."    He was compliant with home exercise program.  Response to previous treatment: tolerated well  Functional change: ongoing    Pain: 0/10  Location: n/a    OBJECTIVE       Treatment     Mikie received the treatments listed below:      therapeutic exercises to develop strength, endurance, ROM and flexibility for 30 minutes including:    Nu-Step 7 min level 5  Hip hinges 3x10  Squats in front of box with VC to hip hinge x12  Slant board 2x1'  Forward step ups 6 in step: 2x10/each (pt demonstrates fear avoidant patterns with falling)  Rows with Scapular retractions with orange band x30  Thoracic extension 15x 5" holds    Not Performed:  SLR: 20x/each  Bridges with isometric hip adduction with pilates ball: 3x10  Sidelying hip abduction 3x8 /each   Long Arc Quad with 2# ankle weights 3x10, 3 sec hold  Prone quad stretch 1x each leg  Supine rudy test stretch 1' each leg  Recumbent bike 8' at level 5 resistance  Knee extension machine no weight added (15#) 3x10  Leg press 70# 3x10    Sit to stand from 18" box with hip hinge: 3x10    neuromuscular re-education " "activities to improve: Balance and Coordination for 15 minutes. The following activities were included:    Step overs on 6" step CGA 2x10  Standing feet together eyes open/eyes closed 3x30" each  Toe taps on 6" step 3x10    Not performed:  Static romberg stance 4x30"  Lateral walking 3 laps in // bars  Marching with SLS 2 laps in // b ars  Tandem walking 2 laps in // bars    Patient Education and Home Exercises     Home Exercises Provided and Patient Education Provided     Education provided:   - to continue HEP    Written Home Exercises Provided: Patient instructed to cont prior HEP. Exercises were reviewed and Mikie was able to demonstrate them prior to the end of the session.  Mikie demonstrated good  understanding of the education provided. See EMR under Patient Instructions for exercises provided during therapy sessions    ASSESSMENT     Mikie tolerated session well despite having c/o muscle soreness from previous session and HEP performance yesterday. Pt demonstrated decreased high guarding position during step ups and toe taps, indicating improved balance confidence and improved joint proprioception. Continue to progress.    Mikie Is progressing well towards his goals.   Pt prognosis is Good.     Pt will continue to benefit from skilled outpatient physical therapy to address the deficits listed in the problem list box on initial evaluation, provide pt/family education and to maximize pt's level of independence in the home and community environment.     Pt's spiritual, cultural and educational needs considered and pt agreeable to plan of care and goals.     Anticipated Barriers for therapy: scheduling, hx of gout       Goals:     SHORT TERM GOALS:  4 weeks 7/11/2022   Recent signs and systems trend is improving in order to progress towards LTG's.     Patient will be independent with HEP in order to further progress and return to maximal function.     Pain rating at Worst: 5/10 in order to progress towards " increased independence with activity.     Patient will be able to correct postural deviations in sitting and standing, to decrease pain and promote postural awareness for injury prevention.         LONG TERM GOALS: 8 weeks 7/11/2022   Patient will return to normal ADL, recreational, and work related activities with less pain and limitation.      Patient will improve AROM to stated goals in order to return to maximal functional potential.      Patient will improve Strength to stated goals of appropriate musculature in order to improve functional independence.      Pain Rating at Best: 1/10 to improve Quality of Life.      Patient will meet predicted functional outcome (FOTO) score: 34% to increase self-worth & perceived functional ability.     Patient will have met/partially met personal goal of: Climbing his ladder to perform house work with full confidence in order to demonstrate return to PLOF.            PLAN     Continue to progress per PT PLAN OF CARE.    Hernán Alcantara, PT               None

## 2023-01-19 ENCOUNTER — INPATIENT (INPATIENT)
Facility: HOSPITAL | Age: 83
LOS: 0 days | Discharge: HOME HEALTH | DRG: 149 | End: 2023-01-20
Attending: FAMILY MEDICINE | Admitting: INTERNAL MEDICINE
Payer: MEDICARE

## 2023-01-19 VITALS — WEIGHT: 250 LBS | HEIGHT: 67 IN

## 2023-01-19 DIAGNOSIS — Z90.5 ACQUIRED ABSENCE OF KIDNEY: Chronic | ICD-10-CM

## 2023-01-19 DIAGNOSIS — R42 DIZZINESS AND GIDDINESS: ICD-10-CM

## 2023-01-19 LAB
ALBUMIN SERPL ELPH-MCNC: 4.2 G/DL — SIGNIFICANT CHANGE UP (ref 3.3–5)
ALP SERPL-CCNC: 74 U/L — SIGNIFICANT CHANGE UP (ref 40–120)
ALT FLD-CCNC: 25 U/L — SIGNIFICANT CHANGE UP (ref 12–78)
ANION GAP SERPL CALC-SCNC: 8 MMOL/L — SIGNIFICANT CHANGE UP (ref 5–17)
APPEARANCE UR: CLEAR — SIGNIFICANT CHANGE UP
APTT BLD: 34.8 SEC — SIGNIFICANT CHANGE UP (ref 27.5–35.5)
AST SERPL-CCNC: 20 U/L — SIGNIFICANT CHANGE UP (ref 15–37)
BACTERIA # UR AUTO: NEGATIVE — SIGNIFICANT CHANGE UP
BASOPHILS # BLD AUTO: 0.03 K/UL — SIGNIFICANT CHANGE UP (ref 0–0.2)
BASOPHILS NFR BLD AUTO: 0.5 % — SIGNIFICANT CHANGE UP (ref 0–2)
BILIRUB SERPL-MCNC: 0.7 MG/DL — SIGNIFICANT CHANGE UP (ref 0.2–1.2)
BILIRUB UR-MCNC: NEGATIVE — SIGNIFICANT CHANGE UP
BLD GP AB SCN SERPL QL: SIGNIFICANT CHANGE UP
BUN SERPL-MCNC: 23 MG/DL — SIGNIFICANT CHANGE UP (ref 7–23)
CALCIUM SERPL-MCNC: 9.6 MG/DL — SIGNIFICANT CHANGE UP (ref 8.5–10.1)
CHLORIDE SERPL-SCNC: 106 MMOL/L — SIGNIFICANT CHANGE UP (ref 96–108)
CO2 SERPL-SCNC: 26 MMOL/L — SIGNIFICANT CHANGE UP (ref 22–31)
COLOR SPEC: YELLOW — SIGNIFICANT CHANGE UP
CREAT SERPL-MCNC: 1.24 MG/DL — SIGNIFICANT CHANGE UP (ref 0.5–1.3)
DIFF PNL FLD: ABNORMAL
EGFR: 44 ML/MIN/1.73M2 — LOW
EOSINOPHIL # BLD AUTO: 0.01 K/UL — SIGNIFICANT CHANGE UP (ref 0–0.5)
EOSINOPHIL NFR BLD AUTO: 0.2 % — SIGNIFICANT CHANGE UP (ref 0–6)
EPI CELLS # UR: SIGNIFICANT CHANGE UP
FLUAV AG NPH QL: SIGNIFICANT CHANGE UP
FLUBV AG NPH QL: SIGNIFICANT CHANGE UP
GLUCOSE SERPL-MCNC: 110 MG/DL — HIGH (ref 70–99)
GLUCOSE UR QL: NEGATIVE — SIGNIFICANT CHANGE UP
HCT VFR BLD CALC: 47.9 % — HIGH (ref 34.5–45)
HGB BLD-MCNC: 16 G/DL — HIGH (ref 11.5–15.5)
IMM GRANULOCYTES NFR BLD AUTO: 0.9 % — SIGNIFICANT CHANGE UP (ref 0–0.9)
INR BLD: 1.1 RATIO — SIGNIFICANT CHANGE UP (ref 0.88–1.16)
KETONES UR-MCNC: ABNORMAL
LEUKOCYTE ESTERASE UR-ACNC: NEGATIVE — SIGNIFICANT CHANGE UP
LYMPHOCYTES # BLD AUTO: 2.06 K/UL — SIGNIFICANT CHANGE UP (ref 1–3.3)
LYMPHOCYTES # BLD AUTO: 32.4 % — SIGNIFICANT CHANGE UP (ref 13–44)
MCHC RBC-ENTMCNC: 31.7 PG — SIGNIFICANT CHANGE UP (ref 27–34)
MCHC RBC-ENTMCNC: 33.4 GM/DL — SIGNIFICANT CHANGE UP (ref 32–36)
MCV RBC AUTO: 94.9 FL — SIGNIFICANT CHANGE UP (ref 80–100)
MONOCYTES # BLD AUTO: 0.62 K/UL — SIGNIFICANT CHANGE UP (ref 0–0.9)
MONOCYTES NFR BLD AUTO: 9.8 % — SIGNIFICANT CHANGE UP (ref 2–14)
NEUTROPHILS # BLD AUTO: 3.57 K/UL — SIGNIFICANT CHANGE UP (ref 1.8–7.4)
NEUTROPHILS NFR BLD AUTO: 56.2 % — SIGNIFICANT CHANGE UP (ref 43–77)
NITRITE UR-MCNC: NEGATIVE — SIGNIFICANT CHANGE UP
PH UR: 6 — SIGNIFICANT CHANGE UP (ref 5–8)
PLATELET # BLD AUTO: 165 K/UL — SIGNIFICANT CHANGE UP (ref 150–400)
POTASSIUM SERPL-MCNC: 3.9 MMOL/L — SIGNIFICANT CHANGE UP (ref 3.5–5.3)
POTASSIUM SERPL-SCNC: 3.9 MMOL/L — SIGNIFICANT CHANGE UP (ref 3.5–5.3)
PROT SERPL-MCNC: 7.6 GM/DL — SIGNIFICANT CHANGE UP (ref 6–8.3)
PROT UR-MCNC: 30 MG/DL
PROTHROM AB SERPL-ACNC: 12.8 SEC — SIGNIFICANT CHANGE UP (ref 10.5–13.4)
RBC # BLD: 5.05 M/UL — SIGNIFICANT CHANGE UP (ref 3.8–5.2)
RBC # FLD: 13.3 % — SIGNIFICANT CHANGE UP (ref 10.3–14.5)
RBC CASTS # UR COMP ASSIST: ABNORMAL /HPF (ref 0–4)
RSV RNA NPH QL NAA+NON-PROBE: SIGNIFICANT CHANGE UP
SARS-COV-2 RNA SPEC QL NAA+PROBE: SIGNIFICANT CHANGE UP
SODIUM SERPL-SCNC: 140 MMOL/L — SIGNIFICANT CHANGE UP (ref 135–145)
SP GR SPEC: 1.01 — SIGNIFICANT CHANGE UP (ref 1.01–1.02)
UROBILINOGEN FLD QL: NEGATIVE — SIGNIFICANT CHANGE UP
WBC # BLD: 6.35 K/UL — SIGNIFICANT CHANGE UP (ref 3.8–10.5)
WBC # FLD AUTO: 6.35 K/UL — SIGNIFICANT CHANGE UP (ref 3.8–10.5)
WBC UR QL: SIGNIFICANT CHANGE UP /HPF (ref 0–5)

## 2023-01-19 PROCEDURE — 83036 HEMOGLOBIN GLYCOSYLATED A1C: CPT

## 2023-01-19 PROCEDURE — 76376 3D RENDER W/INTRP POSTPROCES: CPT | Mod: 26

## 2023-01-19 PROCEDURE — 36415 COLL VENOUS BLD VENIPUNCTURE: CPT

## 2023-01-19 PROCEDURE — 76830 TRANSVAGINAL US NON-OB: CPT | Mod: 26

## 2023-01-19 PROCEDURE — 80048 BASIC METABOLIC PNL TOTAL CA: CPT

## 2023-01-19 PROCEDURE — 97162 PT EVAL MOD COMPLEX 30 MIN: CPT | Mod: GP

## 2023-01-19 PROCEDURE — 73552 X-RAY EXAM OF FEMUR 2/>: CPT | Mod: 26,RT

## 2023-01-19 PROCEDURE — 72192 CT PELVIS W/O DYE: CPT | Mod: 26,MA

## 2023-01-19 PROCEDURE — 76830 TRANSVAGINAL US NON-OB: CPT

## 2023-01-19 PROCEDURE — 93010 ELECTROCARDIOGRAM REPORT: CPT

## 2023-01-19 PROCEDURE — 80061 LIPID PANEL: CPT

## 2023-01-19 PROCEDURE — 70450 CT HEAD/BRAIN W/O DYE: CPT | Mod: 26,MA

## 2023-01-19 PROCEDURE — 97116 GAIT TRAINING THERAPY: CPT | Mod: GP

## 2023-01-19 PROCEDURE — 99285 EMERGENCY DEPT VISIT HI MDM: CPT

## 2023-01-19 PROCEDURE — 73502 X-RAY EXAM HIP UNI 2-3 VIEWS: CPT | Mod: 26,RT

## 2023-01-19 RX ORDER — CHOLECALCIFEROL (VITAMIN D3) 125 MCG
1 CAPSULE ORAL
Qty: 0 | Refills: 0 | DISCHARGE

## 2023-01-19 RX ORDER — ACETAMINOPHEN 500 MG
1000 TABLET ORAL ONCE
Refills: 0 | Status: COMPLETED | OUTPATIENT
Start: 2023-01-19 | End: 2023-01-19

## 2023-01-19 RX ORDER — MECLIZINE HCL 12.5 MG
12.5 TABLET ORAL ONCE
Refills: 0 | Status: COMPLETED | OUTPATIENT
Start: 2023-01-19 | End: 2023-01-19

## 2023-01-19 RX ORDER — ACETAMINOPHEN 500 MG
2 TABLET ORAL
Qty: 0 | Refills: 0 | DISCHARGE

## 2023-01-19 RX ORDER — ASPIRIN/CALCIUM CARB/MAGNESIUM 324 MG
324 TABLET ORAL ONCE
Refills: 0 | Status: COMPLETED | OUTPATIENT
Start: 2023-01-19 | End: 2023-01-19

## 2023-01-19 RX ADMIN — Medication 324 MILLIGRAM(S): at 20:20

## 2023-01-19 RX ADMIN — Medication 1000 MILLIGRAM(S): at 18:45

## 2023-01-19 RX ADMIN — Medication 12.5 MILLIGRAM(S): at 18:45

## 2023-01-19 NOTE — ED PROVIDER NOTE - OBJECTIVE STATEMENT
81 yo F with pmHx of nephroectomy as donation to her son, hypothyroidism, arrythmia with c /o 1 week of when she lays down I nbed at night she gets room spinning. lasts a few minutes and resolves. never had vertigo before. No ha no n/v no head trauma. not on OACs. also notes 2 days ago , got her right leg caught in her skirt and twisted at the hip and had right hip pain. now just has weakness in her right leg. went to her pmd dr antonio yesterday and he wanted her to come to the ER for xrays of her right hip.

## 2023-01-19 NOTE — ED PROVIDER NOTE - WR ORDER STATUS 2
Resulted Oral Minoxidil Counseling- I discussed with the patient the risks of oral minoxidil including but not limited to shortness of breath, swelling of the feet or ankles, dizziness, lightheadedness, unwanted hair growth and allergic reaction.  The patient verbalized understanding of the proper use and possible adverse effects of oral minoxidil.  All of the patient's questions and concerns were addressed.

## 2023-01-19 NOTE — ED ADULT TRIAGE NOTE - CHIEF COMPLAINT QUOTE
Presents with multiple medical complaints. First complaining of intermittent vertigo x1 week. States she has a history of vertigo, but this is lasting longer than usual. Second complaint, states that she was having pain and difficulty with ROM and applying pressure to right leg, having right leg and right sided groin pain. Saw PMD yesterday who advised to come to ED to r/o femur fracture. Has been taking oxycodone with no relief. Third complaint, patient states due to her being overweight, she has excess skin over stomach and gets frequent skin infections. States she is having increased pain to site and is concerned for infection. Arrived to ED in wheelchair. Breathing even and non labored, no distress noted. Denies any CP/SOB.

## 2023-01-19 NOTE — ED PROVIDER NOTE - PHYSICAL EXAMINATION
Constitutional: NAD AOx3 obese  Eyes: PERRL EOMI no nystagmus  Head: Normocephalic atraumatic  Mouth: MMM  Cardiac: regular rate and rhythm  Resp: Lungs CTAB  GI: Abd s/nd/nt  Neuro: CN2-12 grossly intact, EASLEY x 4  Skin: No visible rashes   pelvis: nttp right hip, dec rom at right hip, unable to lift right leg off of stretcher, no sensory deficit.  left leg 5/5 motor

## 2023-01-19 NOTE — ED STATDOCS - NS ED ATTENDING STATEMENT MOD
Attending Only
Conrad PGY2: 63M hx of new dx afib/flutter s/p stent on ac, Eliquis plavix presents with a cc of gradual onset top of head HA x2 days a/w n/v today, no fevers, does not normally get headaches, was initially responsive to APAP x2 days ago, now not better though no meds today, no fall trauma, photophobia and phonophobia at home but resolved prior to ED arrival, able to ambulate at baseline, no neck pain able to ROM comfortably, no rash exam vss fever 100.4 not tachycardic neuro exam non focal low c/f meningitis will check labs cth r/o ich ivf pain control reassess

## 2023-01-19 NOTE — ED PROVIDER NOTE - CLINICAL SUMMARY MEDICAL DECISION MAKING FREE TEXT BOX
pt with new vertigo, only at night when she lays down in bed. also with right hip injury and dec rom of right leg. will check ct head, labs, xrs. ReEval. Pt unable to get iv contrast due to anaphylaxis. anticipate admit to hospital for cva work up.

## 2023-01-19 NOTE — ED STATDOCS - PROGRESS NOTE DETAILS
83 yo hx of hypothyroidism, anxiety, presents with CC of vertigo and right hip pain.  Patient states vertigo started 1 week ago, worse with lying down, feels like spinning sensation.  Also c/o of right hip pain, difficulty walking after kicking a skirt on the floor.  Discussed with her PCP Dr. Roper who was concerned about possible hip fracture and came to ED for further evaluation.  Given vertigo, difficulty walking 2/2 pain and vertigo, advanced age, sending to MAIN for further evaluation Akira Owens D.O.

## 2023-01-19 NOTE — PHARMACOTHERAPY INTERVENTION NOTE - COMMENTS
Medication history complete, reviewed medications with patient and confirmed with doctor first med hx

## 2023-01-19 NOTE — ED PROVIDER NOTE - PROGRESS NOTE DETAILS
pt unaware of prior cyst on right ovary. pelvis sono ordered. d/w pt and  need for admit for cva work up given vertiog and right leg weakness. unclear if right leg weakness is from groin strain or more central cause. pt unable to have ct angio brain and neck in ED due to anaphylaxis to IV dye.  D/w dr altamirano. aware of pending non emergent pelvis sono. MD PAULINO

## 2023-01-20 ENCOUNTER — TRANSCRIPTION ENCOUNTER (OUTPATIENT)
Age: 83
End: 2023-01-20

## 2023-01-20 VITALS
DIASTOLIC BLOOD PRESSURE: 62 MMHG | TEMPERATURE: 99 F | RESPIRATION RATE: 18 BRPM | SYSTOLIC BLOOD PRESSURE: 124 MMHG | OXYGEN SATURATION: 95 % | HEART RATE: 76 BPM

## 2023-01-20 LAB
A1C WITH ESTIMATED AVERAGE GLUCOSE RESULT: 5.9 % — HIGH (ref 4–5.6)
ADD ON TEST-SPECIMEN IN LAB: SIGNIFICANT CHANGE UP
ANION GAP SERPL CALC-SCNC: 5 MMOL/L — SIGNIFICANT CHANGE UP (ref 5–17)
BUN SERPL-MCNC: 26 MG/DL — HIGH (ref 7–23)
CALCIUM SERPL-MCNC: 9 MG/DL — SIGNIFICANT CHANGE UP (ref 8.5–10.1)
CHLORIDE SERPL-SCNC: 109 MMOL/L — HIGH (ref 96–108)
CHOLEST SERPL-MCNC: 239 MG/DL — HIGH
CO2 SERPL-SCNC: 27 MMOL/L — SIGNIFICANT CHANGE UP (ref 22–31)
CREAT SERPL-MCNC: 1.27 MG/DL — SIGNIFICANT CHANGE UP (ref 0.5–1.3)
CULTURE RESULTS: SIGNIFICANT CHANGE UP
EGFR: 43 ML/MIN/1.73M2 — LOW
ESTIMATED AVERAGE GLUCOSE: 123 MG/DL — HIGH (ref 68–114)
GLUCOSE SERPL-MCNC: 106 MG/DL — HIGH (ref 70–99)
HDLC SERPL-MCNC: 66 MG/DL — SIGNIFICANT CHANGE UP
LIPID PNL WITH DIRECT LDL SERPL: 156 MG/DL — HIGH
NON HDL CHOLESTEROL: 172 MG/DL — HIGH
POTASSIUM SERPL-MCNC: 3.7 MMOL/L — SIGNIFICANT CHANGE UP (ref 3.5–5.3)
POTASSIUM SERPL-SCNC: 3.7 MMOL/L — SIGNIFICANT CHANGE UP (ref 3.5–5.3)
SODIUM SERPL-SCNC: 141 MMOL/L — SIGNIFICANT CHANGE UP (ref 135–145)
SPECIMEN SOURCE: SIGNIFICANT CHANGE UP
TRIGL SERPL-MCNC: 83 MG/DL — SIGNIFICANT CHANGE UP
TROPONIN I, HIGH SENSITIVITY RESULT: 8.7 NG/L — SIGNIFICANT CHANGE UP

## 2023-01-20 PROCEDURE — 99223 1ST HOSP IP/OBS HIGH 75: CPT

## 2023-01-20 PROCEDURE — 12345: CPT | Mod: NC

## 2023-01-20 PROCEDURE — 99236 HOSP IP/OBS SAME DATE HI 85: CPT

## 2023-01-20 RX ORDER — KETOROLAC TROMETHAMINE 30 MG/ML
15 SYRINGE (ML) INJECTION ONCE
Refills: 0 | Status: DISCONTINUED | OUTPATIENT
Start: 2023-01-20 | End: 2023-01-20

## 2023-01-20 RX ORDER — CHOLECALCIFEROL (VITAMIN D3) 125 MCG
1000 CAPSULE ORAL DAILY
Refills: 0 | Status: DISCONTINUED | OUTPATIENT
Start: 2023-01-20 | End: 2023-01-20

## 2023-01-20 RX ORDER — MECLIZINE HCL 12.5 MG
1 TABLET ORAL
Qty: 15 | Refills: 0
Start: 2023-01-20 | End: 2023-01-24

## 2023-01-20 RX ORDER — ENOXAPARIN SODIUM 100 MG/ML
40 INJECTION SUBCUTANEOUS EVERY 12 HOURS
Refills: 0 | Status: DISCONTINUED | OUTPATIENT
Start: 2023-01-20 | End: 2023-01-20

## 2023-01-20 RX ORDER — LEVOTHYROXINE SODIUM 125 MCG
125 TABLET ORAL DAILY
Refills: 0 | Status: DISCONTINUED | OUTPATIENT
Start: 2023-01-20 | End: 2023-01-20

## 2023-01-20 RX ORDER — ACETAMINOPHEN 500 MG
650 TABLET ORAL EVERY 6 HOURS
Refills: 0 | Status: DISCONTINUED | OUTPATIENT
Start: 2023-01-20 | End: 2023-01-20

## 2023-01-20 RX ORDER — ASPIRIN/CALCIUM CARB/MAGNESIUM 324 MG
1 TABLET ORAL
Qty: 0 | Refills: 0 | DISCHARGE
Start: 2023-01-20

## 2023-01-20 RX ORDER — INFLUENZA VIRUS VACCINE 15; 15; 15; 15 UG/.5ML; UG/.5ML; UG/.5ML; UG/.5ML
0.7 SUSPENSION INTRAMUSCULAR ONCE
Refills: 0 | Status: COMPLETED | OUTPATIENT
Start: 2023-01-20 | End: 2023-01-20

## 2023-01-20 RX ORDER — ATORVASTATIN CALCIUM 80 MG/1
40 TABLET, FILM COATED ORAL AT BEDTIME
Refills: 0 | Status: DISCONTINUED | OUTPATIENT
Start: 2023-01-20 | End: 2023-01-20

## 2023-01-20 RX ORDER — ASPIRIN/CALCIUM CARB/MAGNESIUM 324 MG
81 TABLET ORAL DAILY
Refills: 0 | Status: DISCONTINUED | OUTPATIENT
Start: 2023-01-20 | End: 2023-01-20

## 2023-01-20 RX ADMIN — Medication 15 MILLIGRAM(S): at 11:50

## 2023-01-20 RX ADMIN — Medication 81 MILLIGRAM(S): at 09:53

## 2023-01-20 RX ADMIN — Medication 1000 UNIT(S): at 09:53

## 2023-01-20 RX ADMIN — Medication 125 MICROGRAM(S): at 05:16

## 2023-01-20 NOTE — DISCHARGE NOTE PROVIDER - CARE PROVIDER_API CALL
José Miguel Roper)  Critical Care Medicine; Internal Medicine; Pulmonary Disease; Sleep Medicine  [address] [place] [address]  Phone: [phone]  Fax: [phone]  Follow Up Time: 1 week    Jevon Cadet (DO)  Otolaryngology  [address], [address]  [place] [address]  Phone: [phone]  Fax: [phone]  Follow Up Time: 1 week

## 2023-01-20 NOTE — PHYSICAL THERAPY INITIAL EVALUATION ADULT - DIAGNOSIS, PT EVAL
acute intermittent vertigo (worse with lying supine) , new RLE weakness after "twisting injury" r/o fracture (negative imaging) R iliopsoas complex muscle strain with hip FLEXOR weakness ,+acute intermittent vertigo (worse with lying supine) , new RLE weakness after "twisting injury" r/o fracture (negative imaging)

## 2023-01-20 NOTE — DISCHARGE NOTE PROVIDER - NSDCMRMEDTOKEN_GEN_ALL_CORE_FT
aspirin 81 mg oral delayed release tablet: 1 tab(s) orally once a day  levothyroxine 125 mcg (0.125 mg) oral tablet: 1 tab(s) orally once a day  meclizine 25 mg oral tablet: 1 tab(s) orally 3 times a day, As Needed for vertigo  Tylenol 500 mg oral tablet: 2 tab(s) orally every 6 hours  Vitamin D3 25 mcg (1000 intl units) oral tablet: 1 tab(s) orally once a day

## 2023-01-20 NOTE — CONSULT NOTE ADULT - SUBJECTIVE AND OBJECTIVE BOX
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 The patient states when she lays down in bed at night her symptoms of "room spinning" can last a few minutes then resolves. The patient denies any prior history, denies any associated trauma, nausea, vomiting, diplopia. No preceeding illness, no headaches, ear pain, no N/V, unilateral weakness or numbness of the face or extremities. Sx worse when she lasy back, has to sleep sitting up.    Additionally the patient states she injured her right hip upon attempting to dress herself. She states that she "twisted" her right leg and has since had right hip pain, and now weakness in her right leg.        PAST MEDICAL & SURGICAL HISTORY:  Hypothyroidism      Arrhythmia      H/O unilateral nephrectomy        FAMILY HISTORY:  Family history of sinus bradycardia      Social Hx:  Nonsmoker, no drug or alcohol use  Medications and Allergies ReviewedMEDICATIONS  (STANDING):  aspirin enteric coated 81 milliGRAM(s) Oral daily  atorvastatin 40 milliGRAM(s) Oral at bedtime  cholecalciferol 1000 Unit(s) Oral daily  enoxaparin Injectable 40 milliGRAM(s) SubCutaneous every 12 hours  ketorolac   Injectable 15 milliGRAM(s) IV Push once  levothyroxine 125 MICROGram(s) Oral daily     ROS: Pertinent positives in HPI, all other ROS were reviewed and are negative.      Examination:   Vital Signs Last 24 Hrs  T(C): 37.1 (20 Jan 2023 08:20), Max: 37.1 (20 Jan 2023 08:20)  T(F): 98.7 (20 Jan 2023 08:20), Max: 98.7 (20 Jan 2023 08:20)  HR: 76 (20 Jan 2023 08:20) (66 - 78)  BP: 124/62 (20 Jan 2023 08:20) (121/66 - 153/74)  BP(mean): 95 (19 Jan 2023 16:16) (95 - 95)  RR: 18 (20 Jan 2023 08:20) (17 - 18)  SpO2: 95% (20 Jan 2023 08:20) (94% - 97%)    Parameters below as of 20 Jan 2023 08:20  Patient On (Oxygen Delivery Method): room air      General: Cooperative, NAD   NECK: supple, no masses  ENT: Normal hearing   Vascular : no carotid bruits,   Lungs: CTAB  Chest: RRR, no murmurs  Extremities: nontender, no edema  Musculoskeletal: + pain on right groin on extension and flexion of the hip, good ROM, no adventitious movements, no joint stiffness  Skin: no rash    Neurological Examination:  NIHSS:0  MS: AOx3. Appropriately interactive, normal affect. Speech fluent w/o paraphasic error, repetition, naming, reading is intact   CN: VFFTC, PERLL, EOMI, V1-3 sensation intact, face symmetric, hearing intact, palate elevates symmetrically, tongue midline, SCM equal bilaterally  Motor: normal bulk and tone, no tremor, rigidity or bradykinesia.  5/5 all over, limited RLE proximal due to pain, but 5/5 otherwise  Sens: Intact to light touch.    Reflexes: 0-1/4 all over, downgoing toes b/l  Coord:  No dysmetria, OLIMPIA intact   Gait: Cannot test    Labs: Reviewed    Basic Metabolic Panel (01.20.23 @ 08:34)   Sodium, Serum: 141 mmol/L   Potassium, Serum: 3.7 mmol/L   Chloride, Serum: 109 mmol/L   Carbon Dioxide, Serum: 27 mmol/L   Anion Gap, Serum: 5 mmol/L   Blood Urea Nitrogen, Serum: 26 mg/dL   Creatinine, Serum: 1.27 mg/dL   Glucose, Serum: 106 mg/dL   Calcium, Total Serum: 9.0 mg/dL   eGFR: 43:     Complete Blood Count + Automated Diff (01.19.23 @ 18:15)   WBC Count: 6.35 K/uL   RBC Count: 5.05 M/uL   Hemoglobin: 16.0 g/dL   Hematocrit: 47.9 %   Mean Cell Volume: 94.9 fl   Mean Cell Hemoglobin: 31.7 pg   Mean Cell Hemoglobin Conc: 33.4 gm/dL   Red Cell Distrib Width: 13.3 %   Platelet Count - Automated: 165 K/uL         Imaging:   < from: CT Head No Cont (01.19.23 @ 18:12) >      INTERPRETATION:  History: Vertigo. First complaining of intermittent   vertigo x1 week. States she has a history of  vertigo, but this is lasting longer than usual.    Description: A noncontrast head CT was performed. Axial images were   performed from the skull base to the vertex with coronal/sagittal   reconstructions.    There is no evidence for acute infarct, acute hemorrhage, mass effect,   calvarial fracture, or hydrocephalus.    Moderately extensive confluent and patchy hypodensity without mass effect   is noted in the periventricular white matter which most likely represents   advanced chronic microvascular ischemic changes given the patient's age.    Cerebral volume loss is present with secondary proportional prominence of   the sulci and ventricles.    No lytic or blastic calvarial lesions are noted. The visualized portions   of the paranasal sinuses and mastoid air cells are clear.    IMPRESSION:    No acute intracranial pathology is noted. If the patient has new and   persistent symptoms, consider short interval follow-up head CT or brain   MRI follow-up if there are no MRI contraindications.    < end of copied text >    < from: CT Pelvis Bony Only No Cont (01.19.23 @ 19:35) >  LEFT HIP JOINT    Avascular Necrosis:  None.    Joint Space:  Relatively maintained although tiny osteophytes and   chondrocalcinosis is noted.    Effusion/Synovitis:  None.    VISUALIZED SPINE  Moderate-to-severe lower lumbar degenerative disc disease is present with   moderate-to-severe spinal canal stenosis at L4-L5.    IMPRESSION:  1.  No fractures are seen.  2.  6.1 x 3.5 cm right ovarian cyst. Correlation with prior imaging or   follow-up pelvic ultrasound is suggested for further characterization.    < end of copied text >

## 2023-01-20 NOTE — H&P ADULT - NSHPPHYSICALEXAM_GEN_ALL_CORE
Vital Signs Last 24 Hrs  T(C): 36.7 (19 Jan 2023 23:55), Max: 36.9 (19 Jan 2023 16:16)  T(F): 98.1 (19 Jan 2023 23:55), Max: 98.4 (19 Jan 2023 16:16)  HR: 73 (19 Jan 2023 23:55) (66 - 78)  BP: 121/66 (19 Jan 2023 23:55) (121/66 - 153/74)  BP(mean): 95 (19 Jan 2023 16:16) (95 - 95)  RR: 18 (19 Jan 2023 23:55) (17 - 18)  SpO2: 95% (19 Jan 2023 23:55) (94% - 97%)    Parameters below as of 19 Jan 2023 23:55  Patient On (Oxygen Delivery Method): room air

## 2023-01-20 NOTE — DISCHARGE NOTE PROVIDER - HOSPITAL COURSE
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 The patient states when she lays down in bed at night her symptoms of "room spinning" can last a few minutes then resolves. The patient denies any prior history, denies any associated trauma, nausea, vomiting, diplopia. Additionally the patient states she injured her right hip upon attempting to dress herself. She states that she "twisted" her right leg and has since had right hip pain, and now weakness in her right leg.  In ED: VS stable. Labs => Hgb/Hct 16.0/47.9. CT Pelvis =>1.  No fractures are seen. 2. 6.1 x 3.5 cm right ovarian cyst. Transvaginal US => There is a 6 x 4 x 4.3 cm right ovarian cyst with mild amount of calcification within its wall. CT head no acute findings, had advanced chronic microvascular changes. Pt was admitted for further evaluation. Was seen by neuro, likely has benign positional vertigo, will need ENT eval. Unlikely CVA or TIA, no need in MRI and unlikely will tolerate it now. Will give Rx for meclizine PRN.   RLE pain likely muscle strain, no Fx on imaging, recommended Ice, PT, Tylenol PRN, may have few  doses of Motrin.   Pt was evaluated by PT, home Pt was recommended.   Pt today reports that feels better, was able to ambulate with walker. Had episode of vertigo while was flat, feels better now. Results, D/c planning and follow up discussed    Vital Signs Last 24 Hrs  T(C): 37.1 (20 Jan 2023 08:20), Max: 37.1 (20 Jan 2023 08:20)  T(F): 98.7 (20 Jan 2023 08:20), Max: 98.7 (20 Jan 2023 08:20)  HR: 76 (20 Jan 2023 08:20) (66 - 78)  BP: 124/62 (20 Jan 2023 08:20) (121/66 - 153/74)  BP(mean): 95 (19 Jan 2023 16:16) (95 - 95)  RR: 18 (20 Jan 2023 08:20) (17 - 18)  SpO2: 95% (20 Jan 2023 08:20) (94% - 97%)    Parameters below as of 20 Jan 2023 08:20  Patient On (Oxygen Delivery Method): room air    PHYSICAL EXAM:  General: Well developed;  in no acute distress  Eyes: EOMI; conjunctiva and sclera clear  Head: Normocephalic; atraumatic  ENMT: No nasal discharge; airway clear  Neck: Supple; non tender; no masses  Respiratory: No wheezes, rales or rhonchi  Cardiovascular: Regular rate and rhythm. S1 and S2 Normal;   Gastrointestinal: Soft non-tender non-distended; Normal bowel sounds  Genitourinary: No  suprapubic  tenderness  Extremities: No edema. R upper thigh tenderness   Neurological: Alert and oriented x3, no facial asymmetry, speech is fluent, MS 5/5,  mild weakness R proximal thigh with pain   Musculoskeletal: Normal muscle tone, without deformities  Psychiatric: Cooperative and appropriate    A/P : 81 y/o F PMHx significant for CKD 3, s/p  unilateral nephrectomy (as donation to her son), hypothyroidism, Asthma and arrythmia admitted for:     #Benign Positional vertigo   CT head neg for acute findings  Non focal exam  Meclizine PRN   C/w Baby ASA   OutPt ENT eval   F/u with neurologist     # Right Hip Pain and Weakness  likely Musculoskeletal, due to muscle strain   Ice, Tylenol, may take few doses of Motrin PRN   Will arrange home PT   F/u with PCP     # HLD  Low fat diet discussed  Pt would like trial of diet   Will f/u with Dr Roper  to further discuss meds       #Ovarian Cyst  #Thickened endometrium   - outOt GYN eval, d/w Pt and PCP     #CKD 3, Solitary  kidney  oral hydration recommended       #Hypothyroidism  -C/w levothyroxine       Dispo; stable for d/c home with Home PT   Fax d/c summary to PCP  Total time 50 min   D/w Dr Roper

## 2023-01-20 NOTE — PHYSICAL THERAPY INITIAL EVALUATION ADULT - MUSCLE TONE ASSESSMENT, REHAB EVAL
predominant adipose tissue associated with obesity obscures underlying muscle tone to a degree/normal/mildly decreased tone

## 2023-01-20 NOTE — DISCHARGE NOTE NURSING/CASE MANAGEMENT/SOCIAL WORK - PATIENT PORTAL LINK FT
You can access the FollowMyHealth Patient Portal offered by [facility] by registering at the following website: [email or web address]. By joining [employer]’s FollowMyHealth portal, you will also be able to view your health information using other applications (apps) compatible with our system.

## 2023-01-20 NOTE — PHYSICAL THERAPY INITIAL EVALUATION ADULT - MANUAL MUSCLE TESTING RESULTS, REHAB EVAL
except acute focal weakness R hip flexors 2/5 following acute "twisting" injury while kicking off her skirt to get undressed 3-4 days ago/no strength deficits were identified

## 2023-01-20 NOTE — H&P ADULT - HISTORY OF PRESENT ILLNESS
Labs => Hgb/Hct 16.0/47.9. CT Pelvis =>1.  No fractures are seen. 2. 6.1 x 3.5 cm right ovarian cyst. Transvaginal US => There is a 6 x 4 x 4.3 cm right ovarian cyst with mild amount of calcification within its wall. On prior CT pelvis, this cyst measures 6.1 x 3.5 cm and on prior CT abdomen and pelvis of 5/24/2010, this cyst measured approximately 4.9 x 3 cm (remeasured) suggesting mild interval enlargement over a long-duration. Small amount of calcification within   the ovarian cyst wall was also demonstrated on prior CT abdomen and pelvis 5/24/2010. There is no abnormal vascularity of the right ovarian cyst demonstrated on color Doppler imaging. Recommend continued sonographic surveillance of this postmenopausal cyst in approximately one year. Borderline thickened endometrium measures 8-9 mm, which may be mildly heterogeneous. Further evaluation with hysterosonography, hysteroscopy or pelvic MRI can be obtained. CT Head => No acute intracranial pathology is noted. If the patient has new and persistent symptoms, consider short interval follow-up head CT or brain MRI follow-up if there are no MRI contraindications.   83 y/o F PMHx significant for unilateral nephrectomy (as donation to her son), hypothyroidism, and arrythmia presents to the [facility] for further evaluation and management of c/o 1 week history of intermittent vertigo worse with lying down. The patient states when she lays down in bed at night her symptoms of "room spinning" can last a few minutes then resolves. The patient denies any prior history, denies any associated trauma, nausea, vomiting, diplopia. Additionally the patient states she injured her right hip upon attempting to dress herself. She states that she "twisted" her right leg and has since had right hip pain, and now weakness in her right leg.    Labs => Hgb/Hct 16.0/47.9. CT Pelvis =>1.  No fractures are seen. 2. 6.1 x 3.5 cm right ovarian cyst. Transvaginal US => There is a 6 x 4 x 4.3 cm right ovarian cyst with mild amount of calcification within its wall. On prior CT pelvis, this cyst measures 6.1 x 3.5 cm and on prior CT abdomen and pelvis of 5/24/2010, this cyst measured approximately 4.9 x 3 cm (remeasured) suggesting mild interval enlargement over a long-duration. Small amount of calcification within   the ovarian cyst wall was also demonstrated on prior CT abdomen and pelvis 5/24/2010. There is no abnormal vascularity of the right ovarian cyst demonstrated on color Doppler imaging. Recommend continued sonographic surveillance of this postmenopausal cyst in approximately one year. Borderline thickened endometrium measures 8-9 mm, which may be mildly heterogeneous. Further evaluation with hysterosonography, hysteroscopy or pelvic MRI can be obtained. CT Head => No acute intracranial pathology is noted. If the patient has new and persistent symptoms, consider short interval follow-up head CT or brain MRI follow-up if there are no MRI contraindications.

## 2023-01-20 NOTE — DISCHARGE NOTE NURSING/CASE MANAGEMENT/SOCIAL WORK - NSDCPEFALRISK_GEN_ALL_CORE
For information on Fall & Injury Prevention, visit: [email or web address] OR  [email or web address] OR  https://www.cdc.gov/steadi/patient.html

## 2023-01-20 NOTE — PHYSICAL THERAPY INITIAL EVALUATION ADULT - PATIENT/FAMILY/SIGNIFICANT OTHER GOALS STATEMENT, PT EVAL
pt would like to go home and recover if possible , has a walker available to use and  to help her as needed , she is receptive to home care

## 2023-01-20 NOTE — PHYSICAL THERAPY INITIAL EVALUATION ADULT - PLANNED THERAPY INTERVENTIONS, PT EVAL
pain relieving modalities and gradual muscle re-ed R hip FLEXORs , vestibular rehab may be considered if symptoms persist/gait training/ROM/strengthening/transfer training

## 2023-01-20 NOTE — H&P ADULT - ASSESSMENT
83 y/o F PMHx significant for unilateral nephrectomy (as donation to her son), hypothyroidism, and arrythmia presents to the [facility] for further evaluation and management of c/o 1 week history of intermittent vertigo worse with lying down. The patient states when she lays down in bed at night her symptoms of "room spinning" can last a few minutes then resolves. The patient denies any prior history, denies any associated trauma, nausea, vomiting, diplopia. Additionally the patient states she injured her right hip upon attempting to dress herself. She states that she "twisted" her right leg and has since had right hip pain, and now weakness in her right leg.    Labs => Hgb/Hct 16.0/47.9. CT Pelvis =>1.  No fractures are seen. 2. 6.1 x 3.5 cm right ovarian cyst. Transvaginal US => There is a 6 x 4 x 4.3 cm right ovarian cyst with mild amount of calcification within its wall. On prior CT pelvis, this cyst measures 6.1 x 3.5 cm and on prior CT abdomen and pelvis of 5/24/2010, this cyst measured approximately 4.9 x 3 cm (remeasured) suggesting mild interval enlargement over a long-duration. Small amount of calcification within   the ovarian cyst wall was also demonstrated on prior CT abdomen and pelvis 5/24/2010. There is no abnormal vascularity of the right ovarian cyst demonstrated on color Doppler imaging. Recommend continued sonographic surveillance of this postmenopausal cyst in approximately one year. Borderline thickened endometrium measures 8-9 mm, which may be mildly heterogeneous. Further evaluation with hysterosonography, hysteroscopy or pelvic MRI can be obtained. CT Head => No acute intracranial pathology is noted. If the patient has new and persistent symptoms, consider short interval follow-up head CT or brain MRI follow-up if there are no MRI contraindications. 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 The patient states when she lays down in bed at night her symptoms of "room spinning" can last a few minutes then resolves. The patient denies any prior history, denies any associated trauma, nausea, vomiting, diplopia. Additionally the patient states she injured her right hip upon attempting to dress herself. She states that she "twisted" her right leg and has since had right hip pain, and now weakness in her right leg.    Labs => Hgb/Hct 16.0/47.9. CT Pelvis =>1.  No fractures are seen. 2. 6.1 x 3.5 cm right ovarian cyst. Transvaginal US => There is a 6 x 4 x 4.3 cm right ovarian cyst with mild amount of calcification within its wall. On prior CT pelvis, this cyst measures 6.1 x 3.5 cm and on prior CT abdomen and pelvis of 5/24/2010, this cyst measured approximately 4.9 x 3 cm (remeasured) suggesting mild interval enlargement over a long-duration. Small amount of calcification within   the ovarian cyst wall was also demonstrated on prior CT abdomen and pelvis 5/24/2010. There is no abnormal vascularity of the right ovarian cyst demonstrated on color Doppler imaging. Recommend continued sonographic surveillance of this postmenopausal cyst in approximately one year. Borderline thickened endometrium measures 8-9 mm, which may be mildly heterogeneous. Further evaluation with hysterosonography, hysteroscopy or pelvic MRI can be obtained. CT Head => No acute intracranial pathology is noted. If the patient has new and persistent symptoms, consider short interval follow-up head CT or brain MRI follow-up if there are no MRI contraindications.    #Intractable Vertigo  #Right Hip Pain and Weakness  ~admit Telemetry  ~DDx includes CVA vs VBI  ~f/u w/ MR Brain in the am  ~f/u w/ Neurology in the am  ~fall precautions  ~bed alarm  ~PT evaluation  ~cont. ASA 81mg po daily  ~f/u Lipid profile  ~cont. Atorvastatin 40mg po qhs   ~Neuro checks q4h    #Ovarian Cyst  #Thickened endometrium   ~f/u w/ GYN consultation  ~imaging reviewed     #Unilateral kidney  ~avoid nephrotoxic medications    #Hypothyroidism  ~cont. Levothyroxine 125mcg po qam    #Vte ppx  ~cont. Heparin sq

## 2023-01-20 NOTE — PATIENT PROFILE ADULT - FALL HARM RISK - HARM RISK INTERVENTIONS

## 2023-01-20 NOTE — DISCHARGE NOTE PROVIDER - NSDCCPCAREPLAN_GEN_ALL_CORE_FT
PRINCIPAL DISCHARGE DIAGNOSIS  Diagnosis: Vertigo, benign positional  Assessment and Plan of Treatment: meclizine as needed   ENT eval      SECONDARY DISCHARGE DIAGNOSES  Diagnosis: Sprain or strain of joint or adjacent muscle  Assessment and Plan of Treatment: Ice, Tylenold PRN, may take few doses of Motrin as needed with  plenty of fluid   Home PT  F/u with PCP    Diagnosis: HLD (hyperlipidemia)  Assessment and Plan of Treatment: Low fat diet   F/u with PCP to discuss meds

## 2023-01-20 NOTE — DISCHARGE NOTE PROVIDER - PROVIDER TOKENS
PROVIDER:[TOKEN:[[id number]],FOLLOWUP:[1 week]],PROVIDER:[TOKEN:[[id number]],FOLLOWUP:[1 week]]

## 2023-01-20 NOTE — CONSULT NOTE ADULT - ASSESSMENT
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 Exam non focal, painful right groin X-rays show no fx, likely musculoskeletal, doubt CVA/TIA.  vertigo appears peripheral vestibular origin, no UMN findings.  Suggest:  meclizine 12.5 mg po TId  may benefit from OPT vestibular rehab referral  stretching of the right hip, leg, opt rehab if persists   Can refer to my office OPT when d/c

## 2023-01-20 NOTE — PHYSICAL THERAPY INITIAL EVALUATION ADULT - GAIT PATTERN USED, PT EVAL
steady, no c/o dizziness , no witnessed dysequilibrium with amb and directional changes while amb/2-point gait

## 2023-01-20 NOTE — PHYSICAL THERAPY INITIAL EVALUATION ADULT - IMPAIRMENTS FOUND, PT EVAL
decreased AROM /muscle strength R HIP FLEXORS with recent twisting injury, suspect acute strain R iliopsoas complex/joint integrity and mobility/muscle strength/ROM

## 2023-01-20 NOTE — PHYSICAL THERAPY INITIAL EVALUATION ADULT - GENERAL OBSERVATIONS, REHAB EVAL
person of size, sitting up in bed long seated position with  at bedside, pt wears eyeglasses, awake,alert, OX4, pleasant & cooperative , pt reports amb to BR with NA just prior to Rx and was returned to bed FLAT supine lying which triggered recurrent vertigo, better in sitting upright

## 2023-01-20 NOTE — PHYSICAL THERAPY INITIAL EVALUATION ADULT - PERTINENT HX OF CURRENT PROBLEM, REHAB EVAL
PMHx significant for unilateral nephrectomy (as donated one kidney  to her son), hypothyroidism, and arrythmia presents to the  ED for further evaluation and management of c/o 1 week history of intermittent vertigo worse with lying down. The patient states when she lays down in bed at night her symptoms of "room spinning" can last a few minutes then resolves. The patient denies any prior history, denies any associated trauma, nausea, vomiting, diplopia. No preceding illness, no headaches, ear pain, no N/V, unilateral weakness or numbness of the face or extremities. Sx worse when she lays back, has to sleep sitting up.    Additionally the patient states she injured her right hip upon attempting to dress herself. She states that she "twisted" her right leg and has since had right hip pain, and now weakness in her right leg. PMHx significant for unilateral nephrectomy (as donated one kidney  to her son), hypothyroidism, and arrythmia presents to the  ED for further evaluation and management of c/o 1 week history of intermittent vertigo worse with lying down. The patient states when she lays down in bed at night her symptoms of "room spinning" can last a few minutes then resolves. The patient denies any prior history, denies any associated trauma, nausea, vomiting, diplopia. No preceding illness, no headaches, ear pain, no N/V, unilateral weakness or numbness of the face or extremities. Sx worse when she lays back, has to sleep sitting up.    Additionally the patient states she injured her right hip upon attempting to dress herself shaking R leg to kick off a skirt, states that she "twisted" her right leg and has since had right hip pain, and now weakness in her right leg.

## 2023-01-20 NOTE — PHYSICAL THERAPY INITIAL EVALUATION ADULT - ADDITIONAL COMMENTS
pt reports she has "arthritis all over her body" but does not like to take pain meds as they make her feel unusual /not her self, "abnormal" ,takes Tylenol usually ; pt unclear if she is on Meclazine, spoke with nursing, rec'd 1 time dose yesterday afternoon but can be given Rx for medication on DC for use PRN

## 2023-01-20 NOTE — PHYSICAL THERAPY INITIAL EVALUATION ADULT - LEVEL OF INDEPENDENCE: SUPINE/SIT, REHAB EVAL
· Dysphagia 3 diet with extra gravy / sauce  · Thin liquids ok  · Biotene / Mouth Kote  · Speech pathology following  contact guard/stand-by assist

## 2023-01-20 NOTE — PHYSICAL THERAPY INITIAL EVALUATION ADULT - ACTIVE RANGE OF MOTION EXAMINATION, REHAB EVAL
difficulty Viviana off bed >2" ,trouble initiating heelslide maneuver unassisted ,c/w acute iliopsoas complex muscle injury /likely strain/cesar. upper extremity Active ROM was WNL (within normal limits)/Left LE Active ROM was WFL (within functional limits)/deficits as listed below

## 2023-02-04 DIAGNOSIS — H81.10 BENIGN PAROXYSMAL VERTIGO, UNSPECIFIED EAR: ICD-10-CM

## 2023-02-04 DIAGNOSIS — Z91.041 RADIOGRAPHIC DYE ALLERGY STATUS: ICD-10-CM

## 2023-02-04 DIAGNOSIS — Z88.1 ALLERGY STATUS TO OTHER ANTIBIOTIC AGENTS STATUS: ICD-10-CM

## 2023-02-04 DIAGNOSIS — N18.30 CHRONIC KIDNEY DISEASE, STAGE 3 UNSPECIFIED: ICD-10-CM

## 2023-02-04 DIAGNOSIS — Z79.51 LONG TERM (CURRENT) USE OF INHALED STEROIDS: ICD-10-CM
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
## 2023-02-04 DIAGNOSIS — E03.9 HYPOTHYROIDISM, UNSPECIFIED: ICD-10-CM

## 2023-02-04 DIAGNOSIS — J45.909 UNSPECIFIED ASTHMA, UNCOMPLICATED: ICD-10-CM

## 2023-02-04 DIAGNOSIS — N83.291 OTHER OVARIAN CYST, RIGHT SIDE: ICD-10-CM

## 2023-02-04 DIAGNOSIS — Z90.5 ACQUIRED ABSENCE OF KIDNEY: ICD-10-CM

## 2023-02-04 DIAGNOSIS — Z20.822 CONTACT WITH AND (SUSPECTED) EXPOSURE TO COVID-19: ICD-10-CM

## 2023-02-04 DIAGNOSIS — E78.5 HYPERLIPIDEMIA, UNSPECIFIED: ICD-10-CM

## 2023-02-04 DIAGNOSIS — R93.89 ABNORMAL FINDINGS ON DIAGNOSTIC IMAGING OF OTHER SPECIFIED BODY STRUCTURES: ICD-10-CM

## 2023-02-04 DIAGNOSIS — Z79.890 HORMONE REPLACEMENT THERAPY: ICD-10-CM
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
## 2023-02-04 DIAGNOSIS — S76.011A STRAIN OF MUSCLE, FASCIA AND TENDON OF RIGHT HIP, INITIAL ENCOUNTER: ICD-10-CM

## 2023-02-04 DIAGNOSIS — Z79.899 OTHER LONG TERM (CURRENT) DRUG THERAPY: ICD-10-CM

## 2023-02-04 DIAGNOSIS — Z88.5 ALLERGY STATUS TO NARCOTIC AGENT: ICD-10-CM

## 2023-09-05 NOTE — ED STATDOCS - PRO INTERPRETER NEED 2
Detail Level: Detailed Detail Level: Zone Detail Level: Generalized Topical Retinoids Recommendations: Patient to return to clinic for further evaluation and treatment of atypical lesions briefly noted during today?s evaluation Sunscreen Recommendations: Patient advised to apply SPF 30 or higher 15-20 minutes before going outdoors and 60 minutes afterwards.  Wear broad-rimmed hats English

## 2023-12-27 NOTE — ED STATDOCS - CPE ED ENMT NORM
normal...
no ear pain/no sinus symptoms/no nasal congestion/no nasal obstruction/no abnormal taste sensation/no throat pain/no dysphagia

## 2024-04-30 ENCOUNTER — APPOINTMENT (OUTPATIENT)
Dept: OBGYN | Facility: CLINIC | Age: 84
End: 2024-04-30
Payer: MEDICARE

## 2024-04-30 ENCOUNTER — RESULT CHARGE (OUTPATIENT)
Age: 84
End: 2024-04-30

## 2024-04-30 DIAGNOSIS — Z87.01 PERSONAL HISTORY OF PNEUMONIA (RECURRENT): ICD-10-CM

## 2024-04-30 DIAGNOSIS — Z12.11 ENCOUNTER FOR SCREENING FOR MALIGNANT NEOPLASM OF COLON: ICD-10-CM

## 2024-04-30 DIAGNOSIS — Z12.39 ENCOUNTER FOR OTHER SCREENING FOR MALIGNANT NEOPLASM OF BREAST: ICD-10-CM

## 2024-04-30 DIAGNOSIS — Z13.820 ENCOUNTER FOR SCREENING FOR OSTEOPOROSIS: ICD-10-CM

## 2024-04-30 DIAGNOSIS — Z87.19 PERSONAL HISTORY OF OTHER DISEASES OF THE DIGESTIVE SYSTEM: ICD-10-CM

## 2024-04-30 DIAGNOSIS — G43.909 MIGRAINE, UNSPECIFIED, NOT INTRACTABLE, W/OUT STATUS MIGRAINOSUS: ICD-10-CM

## 2024-04-30 LAB
DATE COLLECTED: NORMAL
HEMOCCULT SP1 STL QL: NEGATIVE
QUALITY CONTROL: YES

## 2024-04-30 PROCEDURE — G0101: CPT

## 2024-04-30 PROCEDURE — 82270 OCCULT BLOOD FECES: CPT

## 2024-04-30 NOTE — DISCUSSION/SUMMARY
[FreeTextEntry1] : Her and her  are from [place]. moved here in their 40s. One daughter still lives in [place]. Their son works at [employer]. They lived in [place] for many years and their son attended bi-lingual school (which cost a small fortune). He now has 2 twin boys who do not get along. The one goes to [employer] and the other in [place].

## 2024-04-30 NOTE — HISTORY OF PRESENT ILLNESS
[FreeTextEntry1] : 83 year old female presents as a new patient.   She was hospitalized at  in 01/2023 secondary to a mobility issue.  An incidental finding on sonogram revealed: 6 x 4 x 4.3 cm right ovarian cyst with mild amount of calcification within its wall. On prior CT pelvis, this cyst measures 6.1 x 3.5 cm and on prior CT abdomen and pelvis of 5/24/2010, this cyst measured approximately 4.9 x 3 cm (remeasured) suggesting mild interval enlargement over a long duration. The endometrial lining measured 8-9mm.   She denies PMB or pelvic pain. She c/o HAZEL for appx 2 years. She states she has a history of OAB, attempted to use medication but could not tolerate the side effects.   PMH: she had breast infection last year which was treated with antbx. she was seen by Dr. Kirkland.

## 2024-04-30 NOTE — PHYSICAL EXAM
[Appropriately responsive] : appropriately responsive [Alert] : alert [No Acute Distress] : no acute distress [No Lymphadenopathy] : no lymphadenopathy [Soft] : soft [Non-tender] : non-tender [Non-distended] : non-distended [No HSM] : No HSM [No Lesions] : no lesions [No Mass] : no mass [Oriented x3] : oriented x3 [Examination Of The Breasts] : a normal appearance [No Masses] : no breast masses were palpable [Vulvar Atrophy] : vulvar atrophy [Labia Majora] : normal [Labia Minora] : normal [Atrophy] : atrophy [Normal] : normal [Uterine Adnexae] : normal [Normal rectal exam] : was normal [Occult Blood Positive] : was negative for occult blood analysis

## 2024-04-30 NOTE — PLAN
[FreeTextEntry1] : Patient to follow up in 1 year for annual GYN exam she is to return in 1 week with TVS to evaluate enlarging ovarian cyst as well as endometrial lining.  consider UDS to evaluate HAZEL Mammogram due: now, rx given Colonoscopy due: likely plan cologard in the future Bone density due: now, rx given Pap ordered Hemoccult ordered All questions answered, patient is agreeable with plan. PMB precautions reviewed

## 2024-05-02 LAB — CYTOLOGY CVX/VAG DOC THIN PREP: NORMAL

## 2024-05-03 ENCOUNTER — APPOINTMENT (OUTPATIENT)
Dept: OBGYN | Facility: CLINIC | Age: 84
End: 2024-05-03

## 2024-05-13 ENCOUNTER — APPOINTMENT (OUTPATIENT)
Dept: ANTEPARTUM | Facility: CLINIC | Age: 84
End: 2024-05-13

## 2024-05-13 DIAGNOSIS — N83.209 UNSPECIFIED OVARIAN CYST, UNSPECIFIED SIDE: ICD-10-CM

## 2024-05-20 ENCOUNTER — NON-APPOINTMENT (OUTPATIENT)
Age: 84
End: 2024-05-20

## 2024-05-23 ENCOUNTER — NON-APPOINTMENT (OUTPATIENT)
Age: 84
End: 2024-05-23

## 2024-05-29 ENCOUNTER — APPOINTMENT (OUTPATIENT)
Dept: OBGYN | Facility: CLINIC | Age: 84
End: 2024-05-29
Payer: MEDICARE

## 2024-05-29 DIAGNOSIS — R93.89 ABNORMAL FINDINGS ON DIAGNOSTIC IMAGING OF OTHER SPECIFIED BODY STRUCTURES: ICD-10-CM

## 2024-05-29 PROCEDURE — 58100 BIOPSY OF UTERUS LINING: CPT

## 2024-05-29 NOTE — PROCEDURE
[Endometrial Biopsy] : Endometrial biopsy [Time out performed] : Pre-procedure time out performed.  Patient's name, date of birth and procedure confirmed. [Consent Obtained] : Consent obtained [Thickened Endometrium] : thickened endometrium [Risks] : risks [Benefits] : benefits [Alternatives] : alternatives [Patient] : patient [Infection] : infection [Bleeding] : bleeding [Allergic Reaction] : allergic reaction [Uterine Perforation] : uterine perforation [Pain] : pain [Negative] : negative pregnancy test [Betadine] : Betadine [None] : none [Tenaculum] : Tenaculum [Easy Passage] : Easy passage [Anteverted] : anteverted [Specimen Collected] : collected [Sent to Pathology] : placed in buffered formalin and sent for pathology [Tolerated Well] : Patient tolerated the procedure well [No Complications] : No complications

## 2024-05-30 LAB
BASOPHILS # BLD AUTO: 0.03 K/UL
BASOPHILS NFR BLD AUTO: 0.5 %
CANCER AG125 SERPL-ACNC: 14 U/ML
EOSINOPHIL # BLD AUTO: 0.01 K/UL
EOSINOPHIL NFR BLD AUTO: 0.2 %
HCT VFR BLD CALC: 47.6 %
HGB BLD-MCNC: 15.1 G/DL
IMM GRANULOCYTES NFR BLD AUTO: 1 %
LYMPHOCYTES # BLD AUTO: 2.48 K/UL
LYMPHOCYTES NFR BLD AUTO: 39.8 %
MAN DIFF?: NORMAL
MCHC RBC-ENTMCNC: 30.6 PG
MCHC RBC-ENTMCNC: 31.7 GM/DL
MCV RBC AUTO: 96.4 FL
MONOCYTES # BLD AUTO: 0.69 K/UL
MONOCYTES NFR BLD AUTO: 11.1 %
NEUTROPHILS # BLD AUTO: 2.96 K/UL
NEUTROPHILS NFR BLD AUTO: 47.4 %
PLATELET # BLD AUTO: 170 K/UL
RBC # BLD: 4.94 M/UL
RBC # FLD: 14 %
WBC # FLD AUTO: 6.23 K/UL

## 2024-05-31 NOTE — PLAN
[FreeTextEntry1] : EMB obtained successfully  will determine treatment plan pending EMB results ca-125 & cbc obtained AQA Pt verbalized understanding   I Mary Beth Thurston [facility] am scribing for the presence of Dr. Tai the following sections HISTORY OF PRESENT ILLNESS, PAST MEDICAL/FAMILY/SOCIAL HISTORY; REVIEW OF SYSTEMS; VITAL SIGNS; PHYSICAL EXAM; DISPOSITION. I personally performed the services described in the documentation, reviewed the documentation recorded by the scribe in my presence and it accurately and completely records my words and actions.

## 2024-06-03 ENCOUNTER — NON-APPOINTMENT (OUTPATIENT)
Age: 84
End: 2024-06-03

## 2024-06-03 LAB — CORE LAB BIOPSY: NORMAL

## 2024-07-03 ENCOUNTER — APPOINTMENT (OUTPATIENT)
Dept: OBGYN | Facility: CLINIC | Age: 84
End: 2024-07-03
Payer: MEDICARE

## 2024-07-03 DIAGNOSIS — R93.89 ABNORMAL FINDINGS ON DIAGNOSTIC IMAGING OF OTHER SPECIFIED BODY STRUCTURES: ICD-10-CM

## 2024-07-03 PROCEDURE — 99213 OFFICE O/P EST LOW 20 MIN: CPT

## 2024-08-19 ENCOUNTER — NON-APPOINTMENT (OUTPATIENT)
Age: 84
End: 2024-08-19

## 2024-08-21 ENCOUNTER — APPOINTMENT (OUTPATIENT)
Dept: OBGYN | Facility: CLINIC | Age: 84
End: 2024-08-21

## 2024-08-23 ENCOUNTER — APPOINTMENT (OUTPATIENT)
Dept: OBGYN | Facility: HOSPITAL | Age: 84
End: 2024-08-23

## 2025-03-06 ENCOUNTER — APPOINTMENT (OUTPATIENT)
Dept: INTERNAL MEDICINE | Facility: CLINIC | Age: 85
End: 2025-03-06
Payer: MEDICARE

## 2025-03-06 VITALS
DIASTOLIC BLOOD PRESSURE: 78 MMHG | SYSTOLIC BLOOD PRESSURE: 128 MMHG | BODY MASS INDEX: 38.61 KG/M2 | HEIGHT: 67 IN | WEIGHT: 246 LBS

## 2025-03-06 DIAGNOSIS — E03.9 HYPOTHYROIDISM, UNSPECIFIED: ICD-10-CM

## 2025-03-06 DIAGNOSIS — E78.5 HYPERLIPIDEMIA, UNSPECIFIED: ICD-10-CM

## 2025-03-06 DIAGNOSIS — Z52.4 KIDNEY DONOR: ICD-10-CM

## 2025-03-06 PROCEDURE — G2211 COMPLEX E/M VISIT ADD ON: CPT

## 2025-03-06 PROCEDURE — 36415 COLL VENOUS BLD VENIPUNCTURE: CPT

## 2025-03-06 PROCEDURE — 99204 OFFICE O/P NEW MOD 45 MIN: CPT

## 2025-03-06 NOTE — REVIEW OF SYSTEMS
[Fever] : no fever [Recent Change In Weight] : ~T no recent weight change [Chest Pain] : no chest pain [Palpitations] : no palpitations [Shortness Of Breath] : no shortness of breath [Abdominal Pain] : no abdominal pain [Back Pain] : back pain [Nail Changes] : nail changes [Fainting] : no fainting

## 2025-03-06 NOTE — ASSESSMENT
[FreeTextEntry1] : Her exam is remarkable for a weight of 246 pounds.  Hyperlipidemia-fasting labs/lipid profile sent.  Hypothyroidism-thyroid function sent. For now continue levothyroxine 125 mcg daily.  History of kidney donation-CMP/CBC sent

## 2025-03-06 NOTE — PHYSICAL EXAM
[No Acute Distress] : no acute distress [No JVD] : no jugular venous distention [Clear to Auscultation] : lungs were clear to auscultation bilaterally [Regular Rhythm] : with a regular rhythm [Normal S1, S2] : normal S1 and S2 [No Murmur] : no murmur heard [No Edema] : there was no peripheral edema [No Focal Deficits] : no focal deficits [Alert and Oriented x3] : oriented to person, place, and time [de-identified] : Central obesity

## 2025-03-06 NOTE — HISTORY OF PRESENT ILLNESS
[de-identified] : 84-year-old female presents for initial visit to establish care, follow-up labs and prescription renewal. She has a history of hypothyroidism and has been on levothyroxine for at least 40 years. States that she has been on the same dose for at least 5 years now. 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 Has been generally well without any recent illness.

## 2025-03-06 NOTE — HEALTH RISK ASSESSMENT
[No] : In the past 12 months have you used drugs other than those required for medical reasons? No [No falls in past year] : Patient reported no falls in the past year [0] : 2) Feeling down, depressed, or hopeless: Not at all (0) [PHQ-2 Negative - No further assessment needed] : PHQ-2 Negative - No further assessment needed [[id number]] : 0 [Change in mental status noted] : No change in mental status noted [Language] : denies difficulty with language [Behavior] : denies difficulty with behavior [Learning/Retaining New Information] : denies difficulty learning/retaining new information [Handling Complex Tasks] : denies difficulty handling complex tasks [Reasoning] : denies difficulty with reasoning [Spatial Ability and Orientation] : denies difficulty with spatial ability and orientation [None] : None [With Significant Other] : lives with significant other [Retired] : retired [] :  [Feels Safe at Home] : Feels safe at home [Fully functional (bathing, dressing, toileting, transferring, walking, feeding)] : Fully functional (bathing, dressing, toileting, transferring, walking, feeding) [Fully functional (using the telephone, shopping, preparing meals, housekeeping, doing laundry, using] : Fully functional and needs no help or supervision to perform IADLs (using the telephone, shopping, preparing meals, housekeeping, doing laundry, using transportation, managing medications and managing finances) [Reports changes in hearing] : Reports no changes in hearing [Reports changes in vision] : Reports no changes in vision [Reports normal functional visual acuity (ie: able to read med bottle)] : Reports normal functional visual acuity [Seat Belt] :  uses seat belt [Travel to Developing Areas] : does not  travel to developing areas [Never] : Never

## 2025-03-07 LAB
BASOPHILS # BLD AUTO: 0.04 K/UL
BASOPHILS NFR BLD AUTO: 0.7 %
EOSINOPHIL # BLD AUTO: 0.01 K/UL
EOSINOPHIL NFR BLD AUTO: 0.2 %
HCT VFR BLD CALC: 46.2 %
HGB BLD-MCNC: 15 G/DL
IMM GRANULOCYTES NFR BLD AUTO: 1 %
LYMPHOCYTES # BLD AUTO: 2.66 K/UL
LYMPHOCYTES NFR BLD AUTO: 43.4 %
MAN DIFF?: NORMAL
MCHC RBC-ENTMCNC: 31.4 PG
MCHC RBC-ENTMCNC: 32.5 G/DL
MCV RBC AUTO: 96.7 FL
MONOCYTES # BLD AUTO: 0.64 K/UL
MONOCYTES NFR BLD AUTO: 10.4 %
NEUTROPHILS # BLD AUTO: 2.72 K/UL
NEUTROPHILS NFR BLD AUTO: 44.3 %
PLATELET # BLD AUTO: 155 K/UL
RBC # BLD: 4.78 M/UL
RBC # FLD: 13.7 %
WBC # FLD AUTO: 6.13 K/UL

## 2025-03-08 LAB
ALBUMIN SERPL ELPH-MCNC: 4.4 G/DL
ALP BLD-CCNC: 78 U/L
ALT SERPL-CCNC: 12 U/L
ANION GAP SERPL CALC-SCNC: 14 MMOL/L
AST SERPL-CCNC: 16 U/L
BILIRUB SERPL-MCNC: 0.5 MG/DL
BUN SERPL-MCNC: 31 MG/DL
CALCIUM SERPL-MCNC: 9.4 MG/DL
CHLORIDE SERPL-SCNC: 103 MMOL/L
CHOLEST SERPL-MCNC: 254 MG/DL
CO2 SERPL-SCNC: 23 MMOL/L
CREAT SERPL-MCNC: 1.32 MG/DL
EGFRCR SERPLBLD CKD-EPI 2021: 40 ML/MIN/1.73M2
GLUCOSE SERPL-MCNC: 103 MG/DL
HDLC SERPL-MCNC: 67 MG/DL
LDLC SERPL CALC-MCNC: 170 MG/DL
NONHDLC SERPL-MCNC: 187 MG/DL
POTASSIUM SERPL-SCNC: 4.5 MMOL/L
PROT SERPL-MCNC: 6.7 G/DL
SODIUM SERPL-SCNC: 141 MMOL/L
TRIGL SERPL-MCNC: 102 MG/DL
TSH SERPL-ACNC: 2.09 UIU/ML

## 2025-04-01 ENCOUNTER — NON-APPOINTMENT (OUTPATIENT)
Age: 85
End: 2025-04-01

## 2025-05-27 ENCOUNTER — EMERGENCY (EMERGENCY)
Facility: HOSPITAL | Age: 85
LOS: 0 days | Discharge: HOME | End: 2025-05-28
Attending: STUDENT IN AN ORGANIZED HEALTH CARE EDUCATION/TRAINING PROGRAM
Payer: MEDICARE

## 2025-05-27 VITALS
RESPIRATION RATE: 18 BRPM | OXYGEN SATURATION: 100 % | DIASTOLIC BLOOD PRESSURE: 65 MMHG | TEMPERATURE: 98 F | SYSTOLIC BLOOD PRESSURE: 134 MMHG | HEART RATE: 81 BPM

## 2025-05-27 DIAGNOSIS — Z91.041 RADIOGRAPHIC DYE ALLERGY STATUS: ICD-10-CM

## 2025-05-27 DIAGNOSIS — Z90.5 ACQUIRED ABSENCE OF KIDNEY: Chronic | ICD-10-CM

## 2025-05-27 DIAGNOSIS — Z88.1 ALLERGY STATUS TO OTHER ANTIBIOTIC AGENTS: ICD-10-CM

## 2025-05-27 DIAGNOSIS — M79.89 OTHER SPECIFIED SOFT TISSUE DISORDERS: ICD-10-CM

## 2025-05-27 DIAGNOSIS — E03.9 HYPOTHYROIDISM, UNSPECIFIED: ICD-10-CM

## 2025-05-27 DIAGNOSIS — Z86.718 PERSONAL HISTORY OF OTHER VENOUS THROMBOSIS AND EMBOLISM: ICD-10-CM

## 2025-05-27 DIAGNOSIS — Z88.5 ALLERGY STATUS TO NARCOTIC AGENT: ICD-10-CM

## 2025-05-27 DIAGNOSIS — M25.571 PAIN IN RIGHT ANKLE AND JOINTS OF RIGHT FOOT: ICD-10-CM

## 2025-05-27 LAB
ALBUMIN SERPL ELPH-MCNC: 4 G/DL — SIGNIFICANT CHANGE UP (ref 3.3–5)
ALP SERPL-CCNC: 77 U/L — SIGNIFICANT CHANGE UP (ref 40–120)
ALT FLD-CCNC: 21 U/L — SIGNIFICANT CHANGE UP (ref 12–78)
ANION GAP SERPL CALC-SCNC: 7 MMOL/L — SIGNIFICANT CHANGE UP (ref 5–17)
APTT BLD: 32.1 SEC — SIGNIFICANT CHANGE UP (ref 26.1–36.8)
AST SERPL-CCNC: 19 U/L — SIGNIFICANT CHANGE UP (ref 15–37)
BASOPHILS # BLD AUTO: 0.03 K/UL — SIGNIFICANT CHANGE UP (ref 0–0.2)
BASOPHILS NFR BLD AUTO: 0.3 % — SIGNIFICANT CHANGE UP (ref 0–2)
BILIRUB SERPL-MCNC: 0.8 MG/DL — SIGNIFICANT CHANGE UP (ref 0.2–1.2)
BUN SERPL-MCNC: 20 MG/DL — SIGNIFICANT CHANGE UP (ref 7–23)
CALCIUM SERPL-MCNC: 9.6 MG/DL — SIGNIFICANT CHANGE UP (ref 8.5–10.1)
CHLORIDE SERPL-SCNC: 107 MMOL/L — SIGNIFICANT CHANGE UP (ref 96–108)
CO2 SERPL-SCNC: 25 MMOL/L — SIGNIFICANT CHANGE UP (ref 22–31)
CREAT SERPL-MCNC: 1.27 MG/DL — SIGNIFICANT CHANGE UP (ref 0.5–1.3)
D DIMER BLD IA.RAPID-MCNC: 231 NG/ML DDU — HIGH
EGFR: 42 ML/MIN/1.73M2 — LOW
EGFR: 42 ML/MIN/1.73M2 — LOW
EOSINOPHIL # BLD AUTO: 0.04 K/UL — SIGNIFICANT CHANGE UP (ref 0–0.5)
EOSINOPHIL NFR BLD AUTO: 0.4 % — SIGNIFICANT CHANGE UP (ref 0–6)
GLUCOSE SERPL-MCNC: 108 MG/DL — HIGH (ref 70–99)
HCT VFR BLD CALC: 45.3 % — HIGH (ref 34.5–45)
HGB BLD-MCNC: 14.7 G/DL — SIGNIFICANT CHANGE UP (ref 11.5–15.5)
IMM GRANULOCYTES # BLD AUTO: 0.09 K/UL — HIGH (ref 0–0.07)
IMM GRANULOCYTES NFR BLD AUTO: 0.9 % — SIGNIFICANT CHANGE UP (ref 0–0.9)
INR BLD: 1.02 RATIO — SIGNIFICANT CHANGE UP (ref 0.85–1.16)
LYMPHOCYTES # BLD AUTO: 1.72 K/UL — SIGNIFICANT CHANGE UP (ref 1–3.3)
LYMPHOCYTES NFR BLD AUTO: 17.2 % — SIGNIFICANT CHANGE UP (ref 13–44)
MCHC RBC-ENTMCNC: 30.6 PG — SIGNIFICANT CHANGE UP (ref 27–34)
MCHC RBC-ENTMCNC: 32.5 G/DL — SIGNIFICANT CHANGE UP (ref 32–36)
MCV RBC AUTO: 94.4 FL — SIGNIFICANT CHANGE UP (ref 80–100)
MONOCYTES # BLD AUTO: 1.12 K/UL — HIGH (ref 0–0.9)
MONOCYTES NFR BLD AUTO: 11.2 % — SIGNIFICANT CHANGE UP (ref 2–14)
NEUTROPHILS # BLD AUTO: 6.98 K/UL — SIGNIFICANT CHANGE UP (ref 1.8–7.4)
NEUTROPHILS NFR BLD AUTO: 70 % — SIGNIFICANT CHANGE UP (ref 43–77)
NRBC # BLD AUTO: 0 K/UL — SIGNIFICANT CHANGE UP (ref 0–0)
NRBC # FLD: 0 K/UL — SIGNIFICANT CHANGE UP (ref 0–0)
NRBC BLD AUTO-RTO: 0 /100 WBCS — SIGNIFICANT CHANGE UP (ref 0–0)
NT-PROBNP SERPL-SCNC: 360 PG/ML — SIGNIFICANT CHANGE UP (ref 0–450)
PLATELET # BLD AUTO: 163 K/UL — SIGNIFICANT CHANGE UP (ref 150–400)
PMV BLD: 12.4 FL — SIGNIFICANT CHANGE UP (ref 7–13)
POTASSIUM SERPL-MCNC: 4 MMOL/L — SIGNIFICANT CHANGE UP (ref 3.5–5.3)
POTASSIUM SERPL-SCNC: 4 MMOL/L — SIGNIFICANT CHANGE UP (ref 3.5–5.3)
PROT SERPL-MCNC: 7.2 GM/DL — SIGNIFICANT CHANGE UP (ref 6–8.3)
PROTHROM AB SERPL-ACNC: 11.7 SEC — SIGNIFICANT CHANGE UP (ref 9.9–13.4)
RBC # BLD: 4.8 M/UL — SIGNIFICANT CHANGE UP (ref 3.8–5.2)
RBC # FLD: 13.4 % — SIGNIFICANT CHANGE UP (ref 10.3–14.5)
SODIUM SERPL-SCNC: 139 MMOL/L — SIGNIFICANT CHANGE UP (ref 135–145)
TROPONIN I, HIGH SENSITIVITY RESULT: 8.81 NG/L — SIGNIFICANT CHANGE UP
WBC # BLD: 9.98 K/UL — SIGNIFICANT CHANGE UP (ref 3.8–10.5)
WBC # FLD AUTO: 9.98 K/UL — SIGNIFICANT CHANGE UP (ref 3.8–10.5)

## 2025-05-27 PROCEDURE — 93970 EXTREMITY STUDY: CPT

## 2025-05-27 PROCEDURE — 85025 COMPLETE CBC W/AUTO DIFF WBC: CPT

## 2025-05-27 PROCEDURE — 85730 THROMBOPLASTIN TIME PARTIAL: CPT

## 2025-05-27 PROCEDURE — 85610 PROTHROMBIN TIME: CPT

## 2025-05-27 PROCEDURE — 84484 ASSAY OF TROPONIN QUANT: CPT

## 2025-05-27 PROCEDURE — 99285 EMERGENCY DEPT VISIT HI MDM: CPT

## 2025-05-27 PROCEDURE — 71045 X-RAY EXAM CHEST 1 VIEW: CPT | Mod: 26

## 2025-05-27 PROCEDURE — 85379 FIBRIN DEGRADATION QUANT: CPT

## 2025-05-27 PROCEDURE — 99285 EMERGENCY DEPT VISIT HI MDM: CPT | Mod: 25

## 2025-05-27 PROCEDURE — 36000 PLACE NEEDLE IN VEIN: CPT

## 2025-05-27 PROCEDURE — 83880 ASSAY OF NATRIURETIC PEPTIDE: CPT

## 2025-05-27 PROCEDURE — 71045 X-RAY EXAM CHEST 1 VIEW: CPT

## 2025-05-27 PROCEDURE — 93005 ELECTROCARDIOGRAM TRACING: CPT

## 2025-05-27 PROCEDURE — 93010 ELECTROCARDIOGRAM REPORT: CPT

## 2025-05-27 PROCEDURE — 36415 COLL VENOUS BLD VENIPUNCTURE: CPT

## 2025-05-27 PROCEDURE — 80053 COMPREHEN METABOLIC PANEL: CPT

## 2025-05-27 PROCEDURE — 93970 EXTREMITY STUDY: CPT | Mod: 26

## 2025-05-27 RX ORDER — CYANOCOBALAMIN 1000 UG/ML
1 INJECTION INTRAMUSCULAR; SUBCUTANEOUS
Refills: 0 | DISCHARGE

## 2025-05-27 RX ORDER — UBIDECARENONE 100 MG
1 CAPSULE ORAL
Refills: 0 | DISCHARGE

## 2025-05-27 NOTE — ED PROVIDER NOTE - NSFOLLOWUPINSTRUCTIONS_ED_ALL_ED_FT
Follow up with your primary doctor and orthopedic surgeon tomorrow.   Apply ice and elevated your feet.   Get repeat duplex ultrasound in 1 week.  Return to the Emergency Department for worsening or persistent symptoms, and/or ANY NEW OR CONCERNING SYMPTOMS. If you have issues obtaining follow up, please call: [phone] or [phone]  to obtain a doctor or specialist who takes your insurance in your area.

## 2025-05-27 NOTE — ED PROVIDER NOTE - CARE PROVIDER_API CALL
Peter Garcia [place]  Orthopaedic Surgery  [address] [facility], [address] [place] [id number]  Phone: [phone]  Fax: [phone]  Follow Up Time: 1-3 Days

## 2025-05-27 NOTE — ED ADULT TRIAGE NOTE - CHIEF COMPLAINT QUOTE
84yF AOx4 in wheelchair, sent to  ED by city MD c/o R leg swelling x multiple days. pt had US of R leg which resulted + DVT. - AC use. pt denies CP, SOB. pt endorsing difficulty ambulating due to pain

## 2025-05-27 NOTE — ED PROVIDER NOTE - OBJECTIVE STATEMENT
84yoF PMH hypothyroid presents with RLE DVT from . Pt states she has had 2wk of RLE pain and swelling behind the knee radiating to the foot. Today she felt tired, winded after walking and has had poor PO intake. No hx PE DVT. No fever, chills, cp, cough, nvd, abd pain, back pain, rashes. 84yoF PMH hypothyroid presents with RLE DVT from . Pt states she has had 2wk of RLE pain and swelling behind the knee radiating to the foot. Today she felt tired, winded after walking and has had poor PO intake. No hx PE DVT. No fever, chills, cp, cough, nvd, abd pain, back pain, rashes.  US duplex shows Positive DVT in the proximal posterior tibial vein 84yoF PMH hypothyroid presents with RLE DVT from . Pt states she has had 2wk of RLE pain and swelling behind the knee radiating to the foot. Today she felt tired and ate less than her usual and states she wants to go home. No hx PE DVT. No fever, chills, cp, SOB, cough, nvd, abd pain, back pain, rashes.  US duplex shows Positive DVT in the proximal posterior tibial vein

## 2025-05-27 NOTE — ED STATDOCS - PROGRESS NOTE DETAILS
Eriberto Pineda for ED attending, Dr. Nina   84 year old female with PMHx of hypothyroidism presents to ED c/o right lower extremity pain and swelling x2 weeks. patient states that initially the pain and swelling was in the calf however 2 days ago began to radiate to her feet, and she now has difficulty ambulating. patient was seen at urgent care [facility] where she had an ultrasound done which showed a positive study for DVT in the proximal posterior tibial vein. denies chest pain, difficulty breathing. no anticoagulant or ASA usage. patient with allergy to IV contrast, will need VQ tomorrow, likely admit on heparin. Will send to main for full workup. Eriberto Pineda for ED attending, Dr. Nina   84 year old female with PMHx of hypothyroidism presents to ED c/o right lower extremity pain and swelling x2 weeks. patient states that initially the pain and swelling was in the calf however 2 days ago began to radiate to her feet, and she now has difficulty ambulating. patient was seen at urgent care PTA where she had an ultrasound done which showed a positive study for DVT in the proximal posterior tibial vein. denies chest pain, difficulty breathing. +fatigue and dec po intake. difficulty getting around her house.  no anticoagulant or ASA usage. patient with severe allergy to IV contrast, will need admit for VQ tomorrow to eval for PE, admit on heparin. Will send to main for full workup.

## 2025-05-27 NOTE — ED PROVIDER NOTE - CLINICAL SUMMARY MEDICAL DECISION MAKING FREE TEXT BOX
Presentation concerning for DVT, PE. Plan for EKG, CXR, labs, Duplex, monitor and likely admit Presentation concerning for DVT, gout, ankle arthritis. Exam is not consistent with cellulitis, septic joint or compartment syndrome. Plan for EKG, CXR, labs, Duplex, monitor and reassess. EKG shows NSR, rate 70, LAD, low voltage QRS, no SRINIVASAN or STD (time 2031). CXR no acute infiltrate. Labs show trop WNL, BNP WNL, ddimer 231 (upper limit ) and age adjusted Ddimer 820. Duplex US neg for DVT. All results reviewed with pt at bedside, pt has no symptoms of PE, offered NSAID or steroids for likely gout in ankle but declines as she has hx of nephrectomy and she prefers to f/u with pcp and ortho tomorrow. Advised to get another duplex US in 1 week, given strict return precautions and dc in stable condition

## 2025-05-27 NOTE — ED PROVIDER NOTE - PHYSICAL EXAMINATION
Constitutional: well appearing, NAD AAOx3  Eyes: EOMI, PERRL  Head: Normocephalic atraumatic  Mouth: no airway obstruction, posterior oropharynx clear without erythema or exudate  Neck: supple  Cardiac: regular rate and rhythm, no MRG  Resp: Lungs CTAB  GI: Abd s/nt/nd  Neuro: CN2-12 intact, strength 5/5x4, sensation grossly intact  Skin: No rashes  MSK: RLE swelling and tenderness to right foot without skin changes, soft calf, 2+ distal pulses, sensation intact Constitutional: well appearing, NAD AAOx3  Eyes: EOMI, PERRL  Head: Normocephalic atraumatic  Mouth: no airway obstruction, posterior oropharynx clear without erythema or exudate  Neck: supple  Cardiac: regular rate and rhythm, no MRG  Resp: Lungs CTAB  GI: Abd s/nt/nd  Neuro: CN2-12 intact, strength 5/5x4, sensation grossly intact  Skin: No rashes  MSK: RLE-minimal swelling and tenderness to right foot without skin changes, soft calf, 2+ distal pulses, sensation intact Constitutional: well appearing, NAD AAOx3, normal gait  Eyes: EOMI, PERRL  Head: Normocephalic atraumatic  Mouth: no airway obstruction, posterior oropharynx clear without erythema or exudate  Neck: supple  Cardiac: regular rate and rhythm, no MRG  Resp: Lungs CTAB  GI: Abd s/nt/nd  Neuro: CN2-12 intact, strength 5/5x4, sensation grossly intact  Skin: No rashes  MSK: RLE-minimal swelling and tenderness to right foot without skin changes, normal rom ankle, soft calf, 2+ distal pulses, sensation intact

## 2025-05-27 NOTE — PHARMACOTHERAPY INTERVENTION NOTE - COMMENTS
Medication reconciliation completed.  Reviewed Medication list and confirmed med allergies with patient; confirmed with Dr. First Meddesmond.

## 2025-05-27 NOTE — ED PROVIDER NOTE - PATIENT PORTAL LINK FT
You can access the FollowMyHealth Patient Portal offered by [facility] by registering at the following website: [email or web address]. By joining [facility]’s FollowMyHealth portal, you will also be able to view your health information using other applications (apps) compatible with our system.

## 2025-05-28 VITALS
SYSTOLIC BLOOD PRESSURE: 128 MMHG | DIASTOLIC BLOOD PRESSURE: 70 MMHG | HEART RATE: 76 BPM | RESPIRATION RATE: 18 BRPM | TEMPERATURE: 98 F | OXYGEN SATURATION: 99 %

## 2025-05-28 NOTE — ED ADULT NURSE NOTE - OBJECTIVE STATEMENT
pt c/o pain to RLL and swelling x days, making it difficult for her to ambulate independently. deny trauma to area. pt had +DVT on US at [facility] and recommended to come here. Pt denies CP/SOB, N/V/D, fever/chills, dizziness. no other complaints at this time. safety and comfort measures maintained.md at bedside

## 2025-05-28 NOTE — ED ADULT NURSE NOTE - NSFALLRISKINTERV_ED_ALL_ED
Assistance OOB with selected safe patient handling equipment if applicable/Assistance with ambulation/Communicate fall risk and risk factors to all staff, patient, and family/Monitor gait and stability/Provide visual cue: yellow wristband, yellow gown, etc/Reinforce activity limits and safety measures with patient and family/Call bell, personal items and telephone in reach/Instruct patient to call for assistance before getting out of bed/chair/stretcher/Non-slip footwear applied when patient is off stretcher/[place] to call system/Physically safe environment - no spills, clutter or unnecessary equipment/Purposeful Proactive Rounding/Room/bathroom lighting operational, light cord in reach

## 2025-06-02 ENCOUNTER — APPOINTMENT (OUTPATIENT)
Dept: INTERNAL MEDICINE | Facility: CLINIC | Age: 85
End: 2025-06-02
Payer: MEDICARE

## 2025-06-02 VITALS
SYSTOLIC BLOOD PRESSURE: 130 MMHG | DIASTOLIC BLOOD PRESSURE: 76 MMHG | WEIGHT: 250 LBS | HEIGHT: 67 IN | BODY MASS INDEX: 39.24 KG/M2

## 2025-06-02 DIAGNOSIS — M79.671 PAIN IN RIGHT FOOT: ICD-10-CM

## 2025-06-02 PROCEDURE — 99214 OFFICE O/P EST MOD 30 MIN: CPT

## 2025-06-02 PROCEDURE — G2211 COMPLEX E/M VISIT ADD ON: CPT

## 2025-06-02 NOTE — HISTORY OF PRESENT ILLNESS
[FreeTextEntry8] : Presents for follow-up of right foot pain and swelling that initially started approximately 5 days ago. Was initially seen at urgent care because of pain and swelling in the calf as well and weight is sent to radiology at [facility] and was told that there was a DVT in her right calf and was sent to the emergency room at [facility].  Repeat testing did not reveal any evidence of thrombosis.  The pain and swelling in the right foot has slowly improved.  She has been afraid to take any NSAID because she has only 1 kidney secondary to donation many years ago.  The pain and swelling in the right calf is completely gone.

## 2025-06-02 NOTE — PHYSICAL EXAM
[No Acute Distress] : no acute distress [No Respiratory Distress] : no respiratory distress  [Normal Rate] : normal rate  [Regular Rhythm] : with a regular rhythm [de-identified] : Right calf is soft, nontender without any swelling.  There is minimal swelling and slight warmth on the top of the distal foot.

## 2025-06-03 ENCOUNTER — OUTPATIENT (OUTPATIENT)
Dept: OUTPATIENT SERVICES | Facility: HOSPITAL | Age: 85
LOS: 1 days | End: 2025-06-03
Payer: MEDICARE

## 2025-06-03 ENCOUNTER — APPOINTMENT (OUTPATIENT)
Dept: RADIOLOGY | Facility: CLINIC | Age: 85
End: 2025-06-03
Payer: MEDICARE

## 2025-06-03 DIAGNOSIS — Z90.5 ACQUIRED ABSENCE OF KIDNEY: Chronic | ICD-10-CM

## 2025-06-03 DIAGNOSIS — M79.671 PAIN IN RIGHT FOOT: ICD-10-CM

## 2025-06-03 PROCEDURE — 73630 X-RAY EXAM OF FOOT: CPT | Mod: 26,RT

## 2025-06-03 PROCEDURE — 73630 X-RAY EXAM OF FOOT: CPT
